# Patient Record
Sex: MALE | HISPANIC OR LATINO | Employment: OTHER | URBAN - METROPOLITAN AREA
[De-identification: names, ages, dates, MRNs, and addresses within clinical notes are randomized per-mention and may not be internally consistent; named-entity substitution may affect disease eponyms.]

---

## 2017-04-07 ENCOUNTER — OFFICE VISIT (OUTPATIENT)
Dept: MEDICAL GROUP | Facility: PHYSICIAN GROUP | Age: 42
End: 2017-04-07
Payer: COMMERCIAL

## 2017-04-07 VITALS
TEMPERATURE: 97.2 F | DIASTOLIC BLOOD PRESSURE: 86 MMHG | WEIGHT: 186 LBS | SYSTOLIC BLOOD PRESSURE: 126 MMHG | HEART RATE: 78 BPM | HEIGHT: 67 IN | OXYGEN SATURATION: 99 % | RESPIRATION RATE: 14 BRPM | BODY MASS INDEX: 29.19 KG/M2

## 2017-04-07 DIAGNOSIS — R73.09 ELEVATED HEMOGLOBIN A1C MEASUREMENT: ICD-10-CM

## 2017-04-07 DIAGNOSIS — Z87.19 H/O GASTROESOPHAGEAL REFLUX (GERD): ICD-10-CM

## 2017-04-07 DIAGNOSIS — M77.12 LEFT TENNIS ELBOW: ICD-10-CM

## 2017-04-07 DIAGNOSIS — F41.9 ANXIETY: ICD-10-CM

## 2017-04-07 PROCEDURE — 99214 OFFICE O/P EST MOD 30 MIN: CPT | Performed by: NURSE PRACTITIONER

## 2017-04-07 RX ORDER — IBUPROFEN 800 MG/1
800 TABLET ORAL EVERY 8 HOURS PRN
Qty: 30 TAB | Refills: 1 | Status: SHIPPED | OUTPATIENT
Start: 2017-04-07 | End: 2018-04-12

## 2017-04-07 ASSESSMENT — PATIENT HEALTH QUESTIONNAIRE - PHQ9: CLINICAL INTERPRETATION OF PHQ2 SCORE: 0

## 2017-04-07 NOTE — ASSESSMENT & PLAN NOTE
Was seen at [facility] 2 years ago with elevated A1c around 6.0.  Did follow lifestyle  Changes.  Is eating fast food with his work.  No polydypsia, polyuria.

## 2017-04-07 NOTE — PROGRESS NOTES
Chief Complaint   Patient presents with   • Establish Care   • Orders Needed     labs   • Palpitations   • Gastrophageal Reflux       Subjective:   Rakan Avalos is a 41 y.o.  male here today to establish care for evaluation and management of:    Elevated hemoglobin A1c measurement  Was seen at [facility] 2 years ago with elevated A1c around 6.0.  Did follow lifestyle  Changes.  Is eating fast food with his work.  No polydypsia, polyuria.      Anxiety  Patient states that sometimes when he comes home from work and has had an extremely tiring day he feels palpitations and anxiety about not being able to get all the work done that he needs to do. Patient is a . And is very busy. Patient states this occurs every 1-2 weeks. When this does occur he states that he just rests and tries to relax.    H/O gastroesophageal reflux (GERD)  Patient was recently seen in urgent care with chest pain that was diagnosed as GERD. He has eliminated spicy foods and coffee from his diet states that if he does have some symptoms he takes a Tums with good relief. He is trying to avoid eating late at night but he sometimes gets home from work at 8:30 or so and then has his meal.    Left tennis elbow  Patient describes tenderness over the lateral epicondyle of his left elbow. Patient states that when he shakes someone's hand or puts pressure on that elbow he has a sharp achy pain. He uses this hand, even though it is his nondominant hand, for his work as a . He has not tried anything for this.         Current medicines (including changes today)  Current Outpatient Prescriptions   Medication Sig Dispense Refill   • ibuprofen (MOTRIN) 800 MG Tab Take 1 Tab by mouth every 8 hours as needed. 30 Tab 1     No current facility-administered medications for this visit.     He  has a past medical history of GERD (gastroesophageal reflux disease) and Elevated blood sugar.    ROS as stated in history of present illness.  No  "chest pain, no shortness of breath, no abdominal pain       Objective:     Blood pressure 126/86, pulse 78, temperature 36.2 °C (97.2 °F), resp. rate 14, height 1.702 m (5' 7.01\"), weight 84.369 kg (186 lb), SpO2 99 %. Body mass index is 29.12 kg/(m^2).   Physical Exam:  Constitutional: Alert, no distress.  Skin: Warm, dry, good turgor,no cyanosis, no rashes in visible areas.  Eye: Equal, round and reactive, conjunctiva clear, lids normal.  Ears: No tenderness, no discharge.  External canals are without any significant edema or erythema.  Tympanic membranes are without any inflammation, no effusion.  Gross auditory acuity is intact.  Nose: symmetrical without tenderness, no discharge.  Mouth/Throat: lips without lesion.  Oropharynx clear.  Throat without erythema, exudates or tonsillar enlargement.  Neck: Trachea midline, no masses, no obvious thyroid enlargement.. No cervical or supraclavicular lymphadenopathy. Range of motion within normal limits.  Neuro: Cranial nerves 2-12 grossly intact.  No sensory deficit.  Respiratory: Unlabored respiratory effort, lungs clear to auscultation, no wheezes, no ronchi.  Cardiovascular: Normal S1, S2, no murmur, no edema.  Abdomen: Soft, non-tender, no masses, no guarding,  no hepatosplenomegaly.  MSK: Left elbow with shooting pain on deep palpation over the lateral epicondyles. No swelling noted. No recent injury reported.  Psych: Alert and oriented x3, normal affect and mood and judgement.        Assessment and Plan:   The following treatment plan was discussed    1. Elevated hemoglobin A1c measurement  This is a new problem to me. Chronic. Unknown status. We will draw lab work to evaluate his metabolic status. We discussed diet and exercise in relationship to a history of an elevated A1c. We'll monitor and follow results.  - COMP METABOLIC PANEL; Future  - HEMOGLOBIN A1C; Future  - LIPID PROFILE; Future    2. H/O gastroesophageal reflux (GERD)  This is a new problem to me. " Chronic. Stable. Continue to manage symptoms with diet. Discussed strategies as eating 6 smaller meals when possible. And to avoid late-night eating. Tums or over-the-counter omeprazole or ranitidine as needed when necessary.    3. Anxiety  This is a new problem to me. Chronic. Stable. We discussed relaxation techniques and exercise as a possible stress reduction technique for him. He does enjoy playing ball or going to the movies with his 14-year-old son.    4. Left tennis elbow  This is a new problem to me. Acute. Ongoing. Ibuprofen 800 mg by mouth twice a day along with ice after work. Educated patient to take ibuprofen with food to avoid gastrointestinal upset. Patient will return in 3 months to follow-up.      Followup: Return in about 3 months (around 7/7/2017) for Labs.

## 2017-04-07 NOTE — ASSESSMENT & PLAN NOTE
Patient describes tenderness over the lateral epicondyle of his left elbow. Patient states that when he shakes someone's hand or puts pressure on that elbow he has a sharp achy pain. He uses this hand, even though it is his nondominant hand, for his work as a . He has not tried anything for this.

## 2017-04-07 NOTE — ASSESSMENT & PLAN NOTE
Patient states that sometimes when he comes home from work and has had an extremely tiring day he feels palpitations and anxiety about not being able to get all the work done that he needs to do. Patient is a . And is very busy. Patient states this occurs every 1-2 weeks. When this does occur he states that he just rests and tries to relax.

## 2017-04-07 NOTE — ASSESSMENT & PLAN NOTE
Patient was recently seen in urgent care with chest pain that was diagnosed as GERD. He has eliminated spicy foods and coffee from his diet states that if he does have some symptoms he takes a Tums with good relief. He is trying to avoid eating late at night but he sometimes gets home from work at 8:30 or so and then has his meal.

## 2017-04-07 NOTE — MR AVS SNAPSHOT
"Rakan Avalos   2017 2:40 PM   Office Visit   MRN: [id number]    Department:  [facility]   Dept Phone:  [phone]    Description:  Male : 1975   Provider:  DERRICK Herrera           Reason for Visit     Establish Care     Orders Needed labs    Palpitations     Gastrophageal Reflux           Allergies as of 2017     No Known Allergies      You were diagnosed with     Elevated hemoglobin A1c measurement   [384615]       H/O gastroesophageal reflux (GERD)   [257082]       Anxiety   [393706]       Left tennis elbow   [399729]         Vital Signs     Blood Pressure Pulse Temperature Respirations Height Weight    126/86 mmHg 78 36.2 °C (97.2 °F) 14 1.702 m (5' 7.01\") 84.369 kg (186 lb)    Body Mass Index Oxygen Saturation Smoking Status             29.12 kg/m2 99% Never Smoker          Basic Information     Date Of Birth Sex Race Ethnicity Preferred Language    1975 Male  or   Origin (Yoruba,Mauritanian,Montserratian,Swiss, etc) English      Your appointments     2017  7:40 AM   Established Patient with DERRICK Herrera   [facility] ([place])    [address] [place] [address] [phone]           You will be receiving a confirmation call a few days before your appointment from our automated call confirmation system.              Problem List              ICD-10-CM Priority Class Noted - Resolved    Elevated hemoglobin A1c measurement R73.09   2017 - Present    H/O gastroesophageal reflux (GERD) Z87.19   2017 - Present    Anxiety F41.9   2017 - Present    Left tennis elbow M77.12   2017 - Present      Health Maintenance        Date Due Completion Dates    IMM DTaP/Tdap/Td Vaccine (1 - Tdap) 1992 ---            Current Immunizations     No immunizations on file.      Below and/or attached are the medications your provider expects you to take. Review all of your home medications and newly ordered " medications with your provider and/or pharmacist. Follow medication instructions as directed by your provider and/or pharmacist. Please keep your medication list with you and share with your provider. Update the information when medications are discontinued, doses are changed, or new medications (including over-the-counter products) are added; and carry medication information at all times in the event of emergency situations     Allergies:  No Known Allergies          Medications  Valid as of: April 07, 2017 -  3:15 PM    Generic Name Brand Name Tablet Size Instructions for use    Ibuprofen (Tab) MOTRIN 800 MG Take 1 Tab by mouth every 8 hours as needed.        .                 Medicines prescribed today were sent to:     [id number]  [facility], [place] - [address] AT [facility]    [address] [place] [address]    Phone: [phone] Fax: [phone]    Open 24 Hours?: No      Medication refill instructions:       If your prescription bottle indicates you have medication refills left, it is not necessary to call your provider’s office. Please contact your pharmacy and they will refill your medication.    If your prescription bottle indicates you do not have any refills left, you may request refills at any time through one of the following ways: The online [employer] system (except Urgent Care), by calling your provider’s office, or by asking your pharmacy to contact your provider’s office with a refill request. Medication refills are processed only during regular business hours and may not be available until the next business day. Your provider may request additional information or to have a follow-up visit with you prior to refilling your medication.   *Please Note: Medication refills are assigned a new Rx number when refilled electronically. Your pharmacy may indicate that no refills were authorized even though a new prescription for the same medication is available at the  pharmacy. Please request the medicine by name with the pharmacy before contacting your provider for a refill.        Your To Do List     Future Labs/Procedures Complete By Expires    COMP METABOLIC PANEL  As directed 4/8/2018    HEMOGLOBIN A1C  As directed 4/8/2018    LIPID PROFILE  As directed 4/8/2018         VisualXcript Access Code: [email or web address]  Expires: 5/7/2017  2:25 PM    VisualXcript  A secure, online tool to manage your health information     [employer]’s VisualXcript® is a secure, online tool that connects you to your personalized health information from the privacy of your home -- day or night - making it very easy for you to manage your healthcare. Once the activation process is completed, you can even access your medical information using the VisualXcript ronald, which is available for free in the Apple Ronald store or Google Play store.     VisualXcript provides the following levels of access (as shown below):   My Chart Features   [facility] Primary Care Doctor [facility]  Specialists [facility]  Urgent  Care [facility]  Primary Care  Doctor   Email your healthcare team securely and privately 24/7 X X X    Manage appointments: schedule your next appointment; view details of past/upcoming appointments X      Request prescription refills. X      View recent personal medical records, including lab and immunizations X X X X   View health record, including health history, allergies, medications X X X X   Read reports about your outpatient visits, procedures, consult and ER notes X X X X   See your discharge summary, which is a recap of your hospital and/or ER visit that includes your diagnosis, lab results, and care plan. X X       How to register for VisualXcript:  1. Go to  [email or web address].  2. Click on the Sign Up Now box, which takes you to the New Member Sign Up page. You will need to provide the following information:  a. Enter your VisualXcript Access Code exactly as it appears at the top of this page. (You will not need to use  this code after you’ve completed the sign-up process. If you do not sign up before the expiration date, you must request a new code.)   b. Enter your date of birth.   c. Enter your home email address.   d. Click Submit, and follow the next screen’s instructions.  3. Create a Thinkaturet ID. This will be your Thinkaturet login ID and cannot be changed, so think of one that is secure and easy to remember.  4. Create a Thinkaturet password. You can change your password at any time.  5. Enter your Password Reset Question and Answer. This can be used at a later time if you forget your password.   6. Enter your e-mail address. This allows you to receive e-mail notifications when new information is available in QuickBlox.  7. Click Sign Up. You can now view your health information.    For assistance activating your QuickBlox account, call [phone]

## 2017-04-08 ENCOUNTER — HOSPITAL ENCOUNTER (OUTPATIENT)
Dept: LAB | Facility: MEDICAL CENTER | Age: 42
End: 2017-04-08
Attending: NURSE PRACTITIONER
Payer: COMMERCIAL

## 2017-04-08 DIAGNOSIS — R73.09 ELEVATED HEMOGLOBIN A1C MEASUREMENT: ICD-10-CM

## 2017-04-08 LAB
ALBUMIN SERPL BCP-MCNC: 4.3 G/DL (ref 3.2–4.9)
ALBUMIN/GLOB SERPL: 1.6 G/DL
ALP SERPL-CCNC: 50 U/L (ref 30–99)
ALT SERPL-CCNC: 19 U/L (ref 2–50)
ANION GAP SERPL CALC-SCNC: 5 MMOL/L (ref 0–11.9)
AST SERPL-CCNC: 14 U/L (ref 12–45)
BILIRUB SERPL-MCNC: 0.6 MG/DL (ref 0.1–1.5)
BUN SERPL-MCNC: 19 MG/DL (ref 8–22)
CALCIUM SERPL-MCNC: 9.1 MG/DL (ref 8.5–10.5)
CHLORIDE SERPL-SCNC: 105 MMOL/L (ref 96–112)
CHOLEST SERPL-MCNC: 219 MG/DL (ref 100–199)
CO2 SERPL-SCNC: 28 MMOL/L (ref 20–33)
CREAT SERPL-MCNC: 0.89 MG/DL (ref 0.5–1.4)
EST. AVERAGE GLUCOSE BLD GHB EST-MCNC: 151 MG/DL
GFR SERPL CREATININE-BSD FRML MDRD: >60 ML/MIN/1.73 M 2
GLOBULIN SER CALC-MCNC: 2.7 G/DL (ref 1.9–3.5)
GLUCOSE SERPL-MCNC: 156 MG/DL (ref 65–99)
HBA1C MFR BLD: 6.9 % (ref 0–5.6)
HDLC SERPL-MCNC: 34 MG/DL
LDLC SERPL CALC-MCNC: ABNORMAL MG/DL
POTASSIUM SERPL-SCNC: 4.5 MMOL/L (ref 3.6–5.5)
PROT SERPL-MCNC: 7 G/DL (ref 6–8.2)
SODIUM SERPL-SCNC: 138 MMOL/L (ref 135–145)
TRIGL SERPL-MCNC: 588 MG/DL (ref 0–149)

## 2017-04-08 PROCEDURE — 80053 COMPREHEN METABOLIC PANEL: CPT

## 2017-04-08 PROCEDURE — 36415 COLL VENOUS BLD VENIPUNCTURE: CPT

## 2017-04-08 PROCEDURE — 83036 HEMOGLOBIN GLYCOSYLATED A1C: CPT

## 2017-04-08 PROCEDURE — 80061 LIPID PANEL: CPT

## 2017-07-12 ENCOUNTER — OFFICE VISIT (OUTPATIENT)
Dept: MEDICAL GROUP | Facility: PHYSICIAN GROUP | Age: 42
End: 2017-07-12
Payer: COMMERCIAL

## 2017-07-12 VITALS
DIASTOLIC BLOOD PRESSURE: 80 MMHG | BODY MASS INDEX: 27.15 KG/M2 | OXYGEN SATURATION: 98 % | SYSTOLIC BLOOD PRESSURE: 116 MMHG | HEART RATE: 63 BPM | RESPIRATION RATE: 14 BRPM | TEMPERATURE: 97.2 F | HEIGHT: 67 IN | WEIGHT: 173 LBS

## 2017-07-12 DIAGNOSIS — E11.9 TYPE 2 DIABETES MELLITUS WITHOUT COMPLICATION, WITHOUT LONG-TERM CURRENT USE OF INSULIN (HCC): ICD-10-CM

## 2017-07-12 PROCEDURE — 99214 OFFICE O/P EST MOD 30 MIN: CPT | Performed by: NURSE PRACTITIONER

## 2017-07-12 NOTE — ASSESSMENT & PLAN NOTE
Started metformin 3 months ago, family has completely changed eating patterns and exercise.  Weight down 13 pounds.  Walking most days.  States he is feeling much better and his whole family has been losing weight with the new diet and exercise.  Denies any numbness, tingling in feet or hands, polydypsia, polyphagia, polyuria.

## 2017-07-12 NOTE — MR AVS SNAPSHOT
"        Rakan Robbie   2017 7:20 AM   Office Visit   MRN: [id number]    Department:  [facility]   Dept Phone:  [phone]    Description:  Male : 1975   Provider:  DERRICK Herrera           Reason for Visit     Follow-Up           Allergies as of 2017     No Known Allergies      You were diagnosed with     Type 2 diabetes mellitus without complication, without long-term current use of insulin (CMS-HCC)   [7554241]         Vital Signs     Blood Pressure Pulse Temperature Respirations Height Weight    116/80 mmHg 63 36.2 °C (97.2 °F) 14 1.702 m (5' 7.01\") 78.472 kg (173 lb)    Body Mass Index Oxygen Saturation Smoking Status             27.09 kg/m2 98% Never Smoker          Basic Information     Date Of Birth Sex Race Ethnicity Preferred Language    1975 Male  or   Origin (Pashto,Luxembourger,Cymraes,Figueroa, etc) English      Your appointments     Oct 12, 2017  7:40 AM   Established Patient with DERRICK Herrera   [facility] ([place])    [address] [place] [address]   [phone]           You will be receiving a confirmation call a few days before your appointment from our automated call confirmation system.              Problem List              ICD-10-CM Priority Class Noted - Resolved    Elevated hemoglobin A1c measurement R73.09   2017 - Present    H/O gastroesophageal reflux (GERD) Z87.19   2017 - Present    Anxiety F41.9   2017 - Present    Left tennis elbow M77.12   2017 - Present    Type 2 diabetes mellitus without complication, without long-term current use of insulin (CMS-HCC) E11.9   2017 - Present      Health Maintenance        Date Due Completion Dates    IMM DTaP/Tdap/Td Vaccine (1 - Tdap) 1992 ---    IMM INFLUENZA (1) 2017 ---            Current Immunizations     No immunizations on file.      Below and/or attached are the medications your provider expects you to take. Review all " of your home medications and newly ordered medications with your provider and/or pharmacist. Follow medication instructions as directed by your provider and/or pharmacist. Please keep your medication list with you and share with your provider. Update the information when medications are discontinued, doses are changed, or new medications (including over-the-counter products) are added; and carry medication information at all times in the event of emergency situations     Allergies:  No Known Allergies          Medications  Valid as of: July 12, 2017 -  7:42 AM    Generic Name Brand Name Tablet Size Instructions for use    Ibuprofen (Tab) MOTRIN 800 MG Take 1 Tab by mouth every 8 hours as needed.        MetFORMIN HCl (Tab) GLUCOPHAGE 500 MG Take 1 Tab by mouth every day.        .                 Medicines prescribed today were sent to:     [facility] [address] [place] [address] [place], [place] - [address] AT [facility] [place] [address] [place] [address]    Phone: [phone] Fax: [phone]    Open 24 Hours?: No      Medication refill instructions:       If your prescription bottle indicates you have medication refills left, it is not necessary to call your provider’s office. Please contact your pharmacy and they will refill your medication.    If your prescription bottle indicates you do not have any refills left, you may request refills at any time through one of the following ways: The online [employer] system (except Urgent Care), by calling your provider’s office, or by asking your pharmacy to contact your provider’s office with a refill request. Medication refills are processed only during regular business hours and may not be available until the next business day. Your provider may request additional information or to have a follow-up visit with you prior to refilling your medication.   *Please Note: Medication refills are assigned a new Rx number when refilled electronically. Your pharmacy may  indicate that no refills were authorized even though a new prescription for the same medication is available at the pharmacy. Please request the medicine by name with the pharmacy before contacting your provider for a refill.        Your To Do List     Future Labs/Procedures Complete By Expires    COMP METABOLIC PANEL  As directed 7/13/2018    HEMOGLOBIN A1C  As directed 7/13/2018    LIPID PROFILE  As directed 7/13/2018    MICROALBUMIN CREAT RATIO URINE  As directed 7/13/2018         MyChart Access Code: Activation code not generated  Current MyChart Status: Active

## 2017-07-12 NOTE — PROGRESS NOTES
"Chief Complaint   Patient presents with   • Follow-Up       Subjective:   Rakan Avalos is a 41 y.o. male here today for evaluation and management of:    Type 2 diabetes mellitus without complication, without long-term current use of insulin (CMS-HCC)  Started metformin 3 months ago, family has completely changed eating patterns and exercise.  Weight down 13 pounds.  Walking most days.  States he is feeling much better and his whole family has been losing weight with the new diet and exercise.  Denies any numbness, tingling in feet or hands, polydypsia, polyphagia, polyuria.           Current medicines (including changes today)  Current Outpatient Prescriptions   Medication Sig Dispense Refill   • metformin (GLUCOPHAGE) 500 MG Tab Take 1 Tab by mouth every day. 30 Tab 3   • ibuprofen (MOTRIN) 800 MG Tab Take 1 Tab by mouth every 8 hours as needed. 30 Tab 1     No current facility-administered medications for this visit.     He  has a past medical history of GERD (gastroesophageal reflux disease); Elevated blood sugar; and Diabetes (CMS-[facility]).    ROS as stated in history of present illness.  No chest pain, no shortness of breath, no abdominal pain       Objective:     Blood pressure 116/80, pulse 63, temperature 36.2 °C (97.2 °F), resp. rate 14, height 1.702 m (5' 7.01\"), weight 78.472 kg (173 lb), SpO2 98 %. Body mass index is 27.09 kg/(m^2). down 13 pounds since our last visit.  Physical Exam:  Constitutional: Alert, no distress.  Skin: Warm, dry, good turgor,no cyanosis, no rashes in visible areas.  Eye: Equal, round and reactive, conjunctiva clear, lids normal.  Ears: No tenderness, no discharge.  External canals are without any significant edema or erythema.  .  Gross auditory acuity is intact.  Nose: symmetrical without tenderness, no discharge.  Mouth/Throat: lips without lesion.  Oropharynx clear.   Neck: Trachea midline, no masses, no obvious thyroid enlargement.. . Range of motion within normal " limits.  Neuro: Cranial nerves 2-12 grossly intact.  No sensory deficit.  Respiratory: Unlabored respiratory effort, lungs clear to auscultation, no wheezes, no ronchi.  Cardiovascular: Normal S1, S2, no murmur, no edema.  Psych: Alert and oriented x3, normal affect and mood and judgement.        Assessment and Plan:   The following treatment plan was discussed    1. Type 2 diabetes mellitus without complication, without long-term current use of insulin (CMS-[facility])  Chronic. Improved. A1c in the office today is 5.4. Patient congratulated on his lifestyle changes and taking control of his diabetes. Patient will continue with metformin 500 mg daily instead of twice a day. We will recheck A1c and other lab work including lipids and triglycerides in 3 months. Patient to follow-up in 3 months to review labs. Continue with diet and exercise program. Monitor and follow.  - HEMOGLOBIN A1C; Future  - COMP METABOLIC PANEL; Future  - LIPID PROFILE; Future  - MICROALBUMIN CREAT RATIO URINE; Future      Followup: Return in about 3 months (around 10/12/2017) for Diabetes.

## 2017-08-14 NOTE — TELEPHONE ENCOUNTER
Requested Prescriptions     Signed Prescriptions Disp Refills   • metformin (GLUCOPHAGE) 500 MG Tab 60 Tab 6     Sig: TAKE 1 TABLET BY MOUTH TWICE DAILY WITH MEALS     Authorizing Provider: MAHESH CORTES A.P.R.N.

## 2017-10-12 ENCOUNTER — TELEPHONE (OUTPATIENT)
Dept: MEDICAL GROUP | Facility: PHYSICIAN GROUP | Age: 42
End: 2017-10-12

## 2017-10-12 ENCOUNTER — OFFICE VISIT (OUTPATIENT)
Dept: MEDICAL GROUP | Facility: PHYSICIAN GROUP | Age: 42
End: 2017-10-12
Payer: COMMERCIAL

## 2017-10-12 VITALS
DIASTOLIC BLOOD PRESSURE: 68 MMHG | HEIGHT: 67 IN | OXYGEN SATURATION: 98 % | BODY MASS INDEX: 26.68 KG/M2 | SYSTOLIC BLOOD PRESSURE: 128 MMHG | TEMPERATURE: 96.6 F | WEIGHT: 170 LBS | HEART RATE: 60 BPM

## 2017-10-12 DIAGNOSIS — F41.9 ANXIETY: ICD-10-CM

## 2017-10-12 DIAGNOSIS — E11.9 TYPE 2 DIABETES MELLITUS WITHOUT COMPLICATION, WITHOUT LONG-TERM CURRENT USE OF INSULIN (HCC): ICD-10-CM

## 2017-10-12 DIAGNOSIS — Z87.19 H/O GASTROESOPHAGEAL REFLUX (GERD): ICD-10-CM

## 2017-10-12 DIAGNOSIS — Z23 NEED FOR VACCINATION: ICD-10-CM

## 2017-10-12 PROBLEM — M77.12 LEFT TENNIS ELBOW: Status: RESOLVED | Noted: 2017-04-07 | Resolved: 2017-10-12

## 2017-10-12 PROBLEM — R73.09 ELEVATED HEMOGLOBIN A1C MEASUREMENT: Status: RESOLVED | Noted: 2017-04-07 | Resolved: 2017-10-12

## 2017-10-12 LAB
HBA1C MFR BLD: 5.5 % (ref ?–5.8)
INT CON NEG: NEGATIVE
INT CON POS: POSITIVE

## 2017-10-12 PROCEDURE — 90472 IMMUNIZATION ADMIN EACH ADD: CPT | Performed by: NURSE PRACTITIONER

## 2017-10-12 PROCEDURE — 90471 IMMUNIZATION ADMIN: CPT | Performed by: NURSE PRACTITIONER

## 2017-10-12 PROCEDURE — 99214 OFFICE O/P EST MOD 30 MIN: CPT | Mod: 25 | Performed by: NURSE PRACTITIONER

## 2017-10-12 PROCEDURE — 90715 TDAP VACCINE 7 YRS/> IM: CPT | Performed by: NURSE PRACTITIONER

## 2017-10-12 PROCEDURE — 90732 PPSV23 VACC 2 YRS+ SUBQ/IM: CPT | Performed by: NURSE PRACTITIONER

## 2017-10-12 PROCEDURE — 83036 HEMOGLOBIN GLYCOSYLATED A1C: CPT | Performed by: NURSE PRACTITIONER

## 2017-10-12 ASSESSMENT — PAIN SCALES - GENERAL: PAINLEVEL: NO PAIN

## 2017-10-12 NOTE — PROGRESS NOTES
"Chief Complaint   Patient presents with   • Follow-Up     DM       Subjective:   Rakan Avalos is a 42 y.o.  male here today for evaluation and management of:    Type 2 diabetes mellitus without complication, without long-term current use of insulin (CMS-HCC)  States that he started taking metformin and was fine for two weeks, then started to get dizzy so he stopped.  He has been watching diet and exercising.  Down 16 pounds.     Anxiety  Much improved with his daily exercise.     H/O gastroesophageal reflux (GERD)  Reports that with diet changes he has solved this issue         Current medicines (including changes today)  Current Outpatient Prescriptions   Medication Sig Dispense Refill   • metformin (GLUCOPHAGE) 500 MG Tab TAKE 1 TABLET BY MOUTH TWICE DAILY WITH MEALS 60 Tab 6   • metformin (GLUCOPHAGE) 500 MG Tab Take 1 Tab by mouth every day. 30 Tab 3   • ibuprofen (MOTRIN) 800 MG Tab Take 1 Tab by mouth every 8 hours as needed. 30 Tab 1     No current facility-administered medications for this visit.      He  has a past medical history of Diabetes (CMS-HCC); Elevated blood sugar; and GERD (gastroesophageal reflux disease).    Ayesha stated in history of present illness.  No chest pain, no shortness of breath, no abdominal pain       Objective:     Blood pressure 128/68, pulse 60, temperature 35.9 °C (96.6 °F), height 1.702 m (5' 7\"), weight 77.1 kg (170 lb), SpO2 98 %. Body mass index is 26.63 kg/m². Patient is down 16 pounds since her last visit  Physical Exam:  Constitutional: Alert, no distress.  Skin: Warm, dry, good turgor,no cyanosis, no rashes in visible areas.  Eye: Equal, round and reactive, conjunctiva clear, lids normal.  Ears: No tenderness, no discharge.  External canals are without any significant edema or erythema.  Tympanic membranes are without any inflammation, no effusion.  Gross auditory acuity is intact.  Nose: symmetrical without tenderness, no discharge.  Mouth/Throat: lips without " lesion.  Oropharynx clear.  Throat without erythema, exudates or tonsillar enlargement.  Neck: Trachea midline, no masses, no obvious thyroid enlargement.. No cervical or supraclavicular lymphadenopathy. Range of motion within normal limits.  Neuro: Cranial nerves 2-12 grossly intact.  No sensory deficit.  Respiratory: Unlabored respiratory effort, lungs clear to auscultation, no wheezes, no ronchi.  Cardiovascular: Normal S1, S2, no murmur, no edema.  Abdomen: Soft, non-tender, no masses, no guarding,  no hepatosplenomegaly.  Psych: Alert and oriented x3, normal affect and mood and judgement.  Monofilament testing with a 10 gram force: sensation intact: intact bilaterally  Visual Inspection: Feet without maceration, ulcers, fissures.  Pedal pulses: intact bilaterally        Assessment and Plan:   The following treatment plan was discussed    1. Type 2 diabetes mellitus without complication, without long-term current use of insulin (CMS-[facility])  Ongoing issue. A1c today is 5.5. He is not on any medication he has made these changes with lifestyle management weight loss diet and exercise. He is feeling somewhat better. He will return in 6 months we will recheck labs at that time. Continue with great lifestyle changes. Monitor and follow.  - POCT  A1C  - Diabetic Monofilament LE Exam  - MICROALBUMIN CREAT RATIO URINE; Future  - LIPID PROFILE; Future  - HEMOGLOBIN A1C; Future  - COMP METABOLIC PANEL; Future    2. Anxiety  Patient states this has resolved. He states his exercise program has helped him immensely. Monitor.    3. H/O gastroesophageal reflux (GERD)  Chronic. Resolved. With his change of diet he has resolved this issue.    4. Need for vaccination  Patient requesting vaccines today. N   acute illness. Consent is signed.I have placed the below orders and discussed them with an approved delegating provider.  The MA is performing the below orders under the direction of Marshal Buck M.D.  - TDAP VACCINE =>8YO IM  -  Pneumococcal Conjugate Vaccine 13-Valent <6yo IM      Followup: Return in about 6 months (around 4/12/2018) for Diabetes, Labs.

## 2017-10-12 NOTE — ASSESSMENT & PLAN NOTE
States that he started taking metformin and was fine for two weeks, then started to get dizzy so he stopped.  He has been watching diet and exercising.  Down 16 pounds.

## 2018-04-12 ENCOUNTER — OFFICE VISIT (OUTPATIENT)
Dept: MEDICAL GROUP | Facility: PHYSICIAN GROUP | Age: 43
End: 2018-04-12
Payer: COMMERCIAL

## 2018-04-12 VITALS
SYSTOLIC BLOOD PRESSURE: 126 MMHG | BODY MASS INDEX: 27.94 KG/M2 | HEIGHT: 67 IN | OXYGEN SATURATION: 99 % | DIASTOLIC BLOOD PRESSURE: 82 MMHG | TEMPERATURE: 97.5 F | WEIGHT: 178 LBS | RESPIRATION RATE: 14 BRPM | HEART RATE: 53 BPM

## 2018-04-12 DIAGNOSIS — E11.9 TYPE 2 DIABETES MELLITUS WITHOUT COMPLICATION, WITHOUT LONG-TERM CURRENT USE OF INSULIN (HCC): ICD-10-CM

## 2018-04-12 DIAGNOSIS — Z87.19 H/O GASTROESOPHAGEAL REFLUX (GERD): ICD-10-CM

## 2018-04-12 DIAGNOSIS — F41.9 ANXIETY: ICD-10-CM

## 2018-04-12 LAB
HBA1C MFR BLD: 5.9 % (ref ?–5.8)
INT CON NEG: NEGATIVE
INT CON POS: POSITIVE

## 2018-04-12 PROCEDURE — 83036 HEMOGLOBIN GLYCOSYLATED A1C: CPT | Performed by: NURSE PRACTITIONER

## 2018-04-12 PROCEDURE — 99213 OFFICE O/P EST LOW 20 MIN: CPT | Performed by: NURSE PRACTITIONER

## 2018-04-12 ASSESSMENT — PATIENT HEALTH QUESTIONNAIRE - PHQ9: CLINICAL INTERPRETATION OF PHQ2 SCORE: 0

## 2018-04-12 NOTE — ASSESSMENT & PLAN NOTE
"A1c today is 5.9.  Not taking any medications.  No symptoms of fatigue, polydypsia, polyuria, polyphagia reported.  Watching diet and trying to exercise. States it has been harder to exercise this winter, but is motivated to get back to the gym.  We discussed \"pre-diabetes\" and for him to continue with diet and exercise management.   "

## 2018-04-12 NOTE — PROGRESS NOTES
"Chief Complaint   Patient presents with   • Follow-Up   • Diabetes       Subjective:   Rakan Avalos is a 42 y.o . male here today for evaluation and management of:    Type 2 diabetes mellitus without complication, without long-term current use of insulin (CMS-HCC)  A1c today is 5.9.  Not taking any medications.  No symptoms of fatigue, polydypsia, polyuria, polyphagia reported.  Watching diet and trying to exercise. States it has been harder to exercise this winter, but is motivated to get back to the gym.  We discussed \"pre-diabetes\" and for him to continue with diet and exercise management.     Anxiety  Resolved.  Doing very well.      H/O gastroesophageal reflux (GERD)  Resolved.  No symptoms with diet changes.          Current medicines (including changes today)  No current outpatient prescriptions on file.     No current facility-administered medications for this visit.      He  has a past medical history of Diabetes (CMS-HCC); Elevated blood sugar; and GERD (gastroesophageal reflux disease).    ROS as stated in hpi  No chest pain, no shortness of breath, no abdominal pain       Objective:     Blood pressure 126/82, pulse (!) 53, temperature 36.4 °C (97.5 °F), resp. rate 14, height 1.702 m (5' 7\"), weight 80.7 kg (178 lb), SpO2 99 %. Body mass index is 27.88 kg/m². stable.   Physical Exam:  Constitutional: Alert, no distress.  Skin: Warm, dry, good turgor,no cyanosis, no rashes in visible areas.  Eye: Equal, round and reactive, conjunctiva clear, lids normal.  Ears: No tenderness, no discharge.  External canals are without any significant edema or erythema.  Tympanic membranes are without any inflammation, no effusion.  Gross auditory acuity is intact.  Nose: symmetrical without tenderness, no discharge.  Mouth/Throat: lips without lesion.  Oropharynx clear.  Throat without erythema, exudates or tonsillar enlargement.  Neck: Trachea midline, no masses, no obvious thyroid enlargement.. No cervical or " supraclavicular lymphadenopathy. Range of motion within normal limits.  Neuro: Cranial nerves 2-12 grossly intact.  No sensory deficit.  Respiratory: Unlabored respiratory effort, lungs clear to auscultation, no wheezes, no ronchi.  Cardiovascular: Normal S1, S2, no murmur, no edema.  Abdomen: Soft, non-tender, no masses, no guarding,  no hepatosplenomegaly.  Psych: Alert and oriented x3, normal affect and mood and judgement.        Assessment and Plan:   The following treatment plan was discussed    1. Type 2 diabetes mellitus without complication, without long-term current use of insulin (CMS-HCC)  Chronic. Ongoing.  A1c 5.9 today. Continue with diet and exercise control.  Will recheck labs in 6 months.  Monitor and follow.   - COMP METABOLIC PANEL; Future  - LIPID PROFILE; Future  - MICROALBUMIN CREAT RATIO URINE; Future  - POCT Hemoglobin A1C  - HEMOGLOBIN A1C; Future    2. Anxiety  Resolved with exercise.    3. H/O gastroesophageal reflux (GERD)  Resolved with dietary changes.       Followup: Return in about 6 months (around 10/12/2018) for Diabetes.

## 2018-10-16 ENCOUNTER — OFFICE VISIT (OUTPATIENT)
Dept: MEDICAL GROUP | Facility: PHYSICIAN GROUP | Age: 43
End: 2018-10-16
Payer: COMMERCIAL

## 2018-10-16 VITALS
OXYGEN SATURATION: 98 % | RESPIRATION RATE: 14 BRPM | BODY MASS INDEX: 27.94 KG/M2 | WEIGHT: 178 LBS | HEART RATE: 56 BPM | SYSTOLIC BLOOD PRESSURE: 126 MMHG | DIASTOLIC BLOOD PRESSURE: 84 MMHG | TEMPERATURE: 96.8 F | HEIGHT: 67 IN

## 2018-10-16 DIAGNOSIS — R73.03 PREDIABETES: ICD-10-CM

## 2018-10-16 DIAGNOSIS — E78.2 ELEVATED CHOLESTEROL WITH ELEVATED TRIGLYCERIDES: ICD-10-CM

## 2018-10-16 LAB
HBA1C MFR BLD: 5.8 % (ref ?–5.8)
INT CON NEG: NEGATIVE
INT CON POS: POSITIVE

## 2018-10-16 PROCEDURE — 83036 HEMOGLOBIN GLYCOSYLATED A1C: CPT | Performed by: NURSE PRACTITIONER

## 2018-10-16 PROCEDURE — 99213 OFFICE O/P EST LOW 20 MIN: CPT | Performed by: NURSE PRACTITIONER

## 2018-10-16 NOTE — PROGRESS NOTES
"Chief Complaint   Patient presents with   • Follow-Up   • Diabetes       Subjective:   Rakan Avalos is a 43 y.o.  male here today for evaluation and management of:    Prediabetes  Here for follow up.  A1c 5.8 today.  Weight stable.  No medications.  Feeling good.  Due for lipids today.  Will change diagnosis to prediabetes.          Current medicines (including changes today)  No current outpatient prescriptions on file.     No current facility-administered medications for this visit.      He  has a past medical history of Diabetes (HCC); Elevated blood sugar; and GERD (gastroesophageal reflux disease).    ROS as stated in hpi  No chest pain, no shortness of breath, no abdominal pain       Objective:     Blood pressure 126/84, pulse (!) 56, temperature 36 °C (96.8 °F), temperature source Temporal, resp. rate 14, height 1.702 m (5' 7\"), weight 80.7 kg (178 lb), SpO2 98 %. Body mass index is 27.88 kg/m². stable.   Physical Exam:  Constitutional: Alert, no distress.  Skin: Warm, dry, good turgor,no cyanosis, no rashes in visible areas.  Eye: Equal, round and reactive, conjunctiva clear, lids normal.  Ears: No tenderness, no discharge.  External canals are without any significant edema or erythema.  Gross auditory acuity is intact.  Nose: symmetrical without tenderness, no discharge.  Mouth/Throat: lips without lesion.  Oropharynx clear.   Neck: Trachea midline, no masses, no obvious thyroid enlargement.. . Range of motion within normal limits.  Neuro: Cranial nerves 2-12 grossly intact.  No sensory deficit.  Respiratory: Unlabored respiratory effort, lungs clear to auscultation, no wheezes, no ronchi.  Cardiovascular: Normal S1, S2, no murmur, no edema.  Psych: Alert and oriented x3, normal affect and mood and judgement.        Assessment and Plan:   The following treatment plan was discussed    1. Elevated cholesterol with elevated triglycerides  Chronic, ongoing.  Labs due.  Monitor and follow.   - COMP " METABOLIC PANEL; Future  - LIPID PROFILE; Future    2. Prediabetes  Chronic conditiion.  A1c today at 5.8.  This is down from 6.9 18 months ago.  Has been exercising, watching diet.  Advised to continue to watch weight, diet and exercise. Return in one year.       Followup: Return in about 1 year (around 10/16/2019) for Annual.         Educated in proper administration of medication(s) ordered today including safety, possible SE, risks, benefits, rationale and alternatives to therapy.     Please note that this dictation was created using voice recognition software. I have made every reasonable attempt to correct obvious errors, but I expect that there are errors of grammar and possibly content that I did not discover before finalizing the note.

## 2018-10-16 NOTE — ASSESSMENT & PLAN NOTE
Here for follow up.  A1c 5.8 today.  Weight stable.  No medications.  Feeling good.  Due for lipids today.  Will change diagnosis to prediabetes.

## 2018-12-17 ENCOUNTER — HOSPITAL ENCOUNTER (OUTPATIENT)
Dept: LAB | Facility: MEDICAL CENTER | Age: 43
End: 2018-12-17
Attending: NURSE PRACTITIONER
Payer: COMMERCIAL

## 2018-12-17 DIAGNOSIS — E78.2 ELEVATED CHOLESTEROL WITH ELEVATED TRIGLYCERIDES: ICD-10-CM

## 2018-12-17 DIAGNOSIS — E11.9 TYPE 2 DIABETES MELLITUS WITHOUT COMPLICATION, WITHOUT LONG-TERM CURRENT USE OF INSULIN (HCC): ICD-10-CM

## 2018-12-17 LAB
ALBUMIN SERPL BCP-MCNC: 4.4 G/DL (ref 3.2–4.9)
ALBUMIN/GLOB SERPL: 1.8 G/DL
ALP SERPL-CCNC: 53 U/L (ref 30–99)
ALT SERPL-CCNC: 16 U/L (ref 2–50)
ANION GAP SERPL CALC-SCNC: 8 MMOL/L (ref 0–11.9)
AST SERPL-CCNC: 14 U/L (ref 12–45)
BILIRUB SERPL-MCNC: 0.6 MG/DL (ref 0.1–1.5)
BUN SERPL-MCNC: 17 MG/DL (ref 8–22)
CALCIUM SERPL-MCNC: 9.4 MG/DL (ref 8.5–10.5)
CHLORIDE SERPL-SCNC: 105 MMOL/L (ref 96–112)
CHOLEST SERPL-MCNC: 195 MG/DL (ref 100–199)
CO2 SERPL-SCNC: 27 MMOL/L (ref 20–33)
CREAT SERPL-MCNC: 1 MG/DL (ref 0.5–1.4)
GLOBULIN SER CALC-MCNC: 2.5 G/DL (ref 1.9–3.5)
GLUCOSE SERPL-MCNC: 119 MG/DL (ref 65–99)
HDLC SERPL-MCNC: 43 MG/DL
LDLC SERPL CALC-MCNC: 103 MG/DL
POTASSIUM SERPL-SCNC: 4.2 MMOL/L (ref 3.6–5.5)
PROT SERPL-MCNC: 6.9 G/DL (ref 6–8.2)
SODIUM SERPL-SCNC: 140 MMOL/L (ref 135–145)
TRIGL SERPL-MCNC: 245 MG/DL (ref 0–149)

## 2018-12-17 PROCEDURE — 80053 COMPREHEN METABOLIC PANEL: CPT

## 2018-12-17 PROCEDURE — 83036 HEMOGLOBIN GLYCOSYLATED A1C: CPT

## 2018-12-17 PROCEDURE — 80061 LIPID PANEL: CPT

## 2018-12-17 PROCEDURE — 36415 COLL VENOUS BLD VENIPUNCTURE: CPT

## 2018-12-18 LAB
EST. AVERAGE GLUCOSE BLD GHB EST-MCNC: 137 MG/DL
HBA1C MFR BLD: 6.4 % (ref 0–5.6)

## 2019-07-27 ENCOUNTER — APPOINTMENT (OUTPATIENT)
Dept: URGENT CARE | Facility: PHYSICIAN GROUP | Age: 44
End: 2019-07-27
Payer: COMMERCIAL

## 2019-07-30 ENCOUNTER — OFFICE VISIT (OUTPATIENT)
Dept: MEDICAL GROUP | Facility: PHYSICIAN GROUP | Age: 44
End: 2019-07-30
Payer: COMMERCIAL

## 2019-07-30 ENCOUNTER — HOSPITAL ENCOUNTER (OUTPATIENT)
Dept: RADIOLOGY | Facility: MEDICAL CENTER | Age: 44
End: 2019-07-30
Attending: NURSE PRACTITIONER
Payer: COMMERCIAL

## 2019-07-30 ENCOUNTER — APPOINTMENT (OUTPATIENT)
Dept: MEDICAL GROUP | Facility: PHYSICIAN GROUP | Age: 44
End: 2019-07-30
Payer: COMMERCIAL

## 2019-07-30 VITALS
HEIGHT: 67 IN | OXYGEN SATURATION: 98 % | WEIGHT: 179 LBS | TEMPERATURE: 97.3 F | SYSTOLIC BLOOD PRESSURE: 124 MMHG | DIASTOLIC BLOOD PRESSURE: 80 MMHG | HEART RATE: 51 BPM | RESPIRATION RATE: 14 BRPM | BODY MASS INDEX: 28.09 KG/M2

## 2019-07-30 DIAGNOSIS — M79.89 PAIN AND SWELLING OF LEFT LOWER LEG: ICD-10-CM

## 2019-07-30 DIAGNOSIS — M79.662 PAIN AND SWELLING OF LEFT LOWER LEG: ICD-10-CM

## 2019-07-30 PROBLEM — D16.22: Status: ACTIVE | Noted: 2019-07-30

## 2019-07-30 PROCEDURE — 73590 X-RAY EXAM OF LOWER LEG: CPT | Mod: LT

## 2019-07-30 PROCEDURE — 99214 OFFICE O/P EST MOD 30 MIN: CPT | Performed by: NURSE PRACTITIONER

## 2019-07-30 ASSESSMENT — PATIENT HEALTH QUESTIONNAIRE - PHQ9: CLINICAL INTERPRETATION OF PHQ2 SCORE: 0

## 2019-07-30 NOTE — ASSESSMENT & PLAN NOTE
Reports two months of intermittent of pain and swelling left lower leg.  No injury reported.  Lays hardwood floors, using full length knee pads.  Took some advil for the pain, elevation.  No redness, hot areas. No swelling appreciated today

## 2019-07-30 NOTE — PROGRESS NOTES
"Chief Complaint   Patient presents with   • Ankle Swelling     left   • Leg Pain     left       Subjective:   Rakan Avalos is a 44 y.o. male here today for evaluation and management of an new acute issue    Pain and swelling of left lower leg  Reports two months of intermittent of pain and swelling left lower leg.  No injury reported.  Lays hardwood floors, using full length knee pads.  Took some advil for the pain, elevation.  No redness, hot areas. No swelling appreciated today           Current medicines (including changes today)  No current outpatient prescriptions on file.     No current facility-administered medications for this visit.      He  has a past medical history of Diabetes (HCC); Elevated blood sugar; GERD (gastroesophageal reflux disease); and Hyperlipidemia.    ROS as stated in HPI  No chest pain, no shortness of breath, no abdominal pain       Objective:     /80   Pulse (!) 51   Temp 36.3 °C (97.3 °F)   Resp 14   Ht 1.702 m (5' 7\")   Wt 81.2 kg (179 lb)   SpO2 98%  Body mass index is 28.04 kg/m².     Physical Exam:  Constitutional: Alert, no distress.  Skin: Warm, dry, good turgor,no cyanosis, no rashes in visible areas.  Eye: Equal, round and reactive, conjunctiva clear, lids normal.  Ears: No tenderness, no discharge.  External canals are without any significant edema or erythema.  Tympanic membranes are without any inflammation, no effusion.  Gross auditory acuity is intact.  Nose: symmetrical without tenderness, no discharge.  Mouth/Throat: lips without lesion.  Oropharynx clear.  Throat without erythema, exudates or tonsillar enlargement.  Neck: Trachea midline, no masses, no obvious thyroid enlargement.. No cervical or supraclavicular lymphadenopathy. Range of motion within normal limits.  Neuro: Cranial nerves 2-12 grossly intact.  No sensory deficit.  Respiratory: Unlabored respiratory effort, lungs clear to auscultation, no wheezes, no ronchi.  Cardiovascular: Normal S1, S2, " no murmur, no edema.  Abdomen: Soft, non-tender, no masses, no guarding,  no hepatosplenomegaly.  msk; left leg without swelling or redness on exam.  There is an area of tenderness over the tibial prominence with pressure.  No bruising noted.  Psych: Alert and oriented x3, normal affect and mood and judgement.        Assessment and Plan:   The following treatment plan was discussed    1. Pain and swelling of left lower leg  This is a new problem to me.  Acute issue.  Suspect that his work kneepads that extend over his leg are causing this issue.  Will obtain an x-ray to rule out any kind of a bony involvement.  Low suspicion for any vascular problem.  X-ray ordered.  Monitor and follow.  Encouraged ice after work.  Continue with Advil for pain.  Monitor and follow.  - DX-TIBIA AND FIBULA LEFT; Future      Followup: Return if symptoms worsen or fail to improve.         Educated in proper administration of medication(s) ordered today including safety, possible SE, risks, benefits, rationale and alternatives to therapy.     Please note that this dictation was created using voice recognition software. I have made every reasonable attempt to correct obvious errors, but I expect that there are errors of grammar and possibly content that I did not discover before finalizing the note.

## 2019-10-16 ENCOUNTER — OFFICE VISIT (OUTPATIENT)
Dept: MEDICAL GROUP | Facility: PHYSICIAN GROUP | Age: 44
End: 2019-10-16
Payer: COMMERCIAL

## 2019-10-16 ENCOUNTER — HOSPITAL ENCOUNTER (OUTPATIENT)
Dept: LAB | Facility: MEDICAL CENTER | Age: 44
End: 2019-10-16
Attending: NURSE PRACTITIONER
Payer: COMMERCIAL

## 2019-10-16 VITALS
HEIGHT: 67 IN | TEMPERATURE: 96.2 F | OXYGEN SATURATION: 99 % | RESPIRATION RATE: 14 BRPM | BODY MASS INDEX: 28.09 KG/M2 | SYSTOLIC BLOOD PRESSURE: 104 MMHG | HEART RATE: 56 BPM | WEIGHT: 179 LBS | DIASTOLIC BLOOD PRESSURE: 62 MMHG

## 2019-10-16 DIAGNOSIS — Z87.19 H/O GASTROESOPHAGEAL REFLUX (GERD): ICD-10-CM

## 2019-10-16 DIAGNOSIS — R73.03 PREDIABETES: ICD-10-CM

## 2019-10-16 DIAGNOSIS — D16.22: ICD-10-CM

## 2019-10-16 DIAGNOSIS — Z00.00 PREVENTATIVE HEALTH CARE: ICD-10-CM

## 2019-10-16 LAB
ALBUMIN SERPL BCP-MCNC: 4.7 G/DL (ref 3.2–4.9)
ALBUMIN/GLOB SERPL: 1.6 G/DL
ALP SERPL-CCNC: 51 U/L (ref 30–99)
ALT SERPL-CCNC: 16 U/L (ref 2–50)
ANION GAP SERPL CALC-SCNC: 7 MMOL/L (ref 0–11.9)
AST SERPL-CCNC: 16 U/L (ref 12–45)
BASOPHILS # BLD AUTO: 0.7 % (ref 0–1.8)
BASOPHILS # BLD: 0.03 K/UL (ref 0–0.12)
BILIRUB SERPL-MCNC: 0.8 MG/DL (ref 0.1–1.5)
BUN SERPL-MCNC: 22 MG/DL (ref 8–22)
CALCIUM SERPL-MCNC: 9.5 MG/DL (ref 8.5–10.5)
CHLORIDE SERPL-SCNC: 102 MMOL/L (ref 96–112)
CHOLEST SERPL-MCNC: 215 MG/DL (ref 100–199)
CO2 SERPL-SCNC: 28 MMOL/L (ref 20–33)
CREAT SERPL-MCNC: 1.03 MG/DL (ref 0.5–1.4)
CREAT UR-MCNC: 154.3 MG/DL
EOSINOPHIL # BLD AUTO: 0.15 K/UL (ref 0–0.51)
EOSINOPHIL NFR BLD: 3.5 % (ref 0–6.9)
ERYTHROCYTE [DISTWIDTH] IN BLOOD BY AUTOMATED COUNT: 39.7 FL (ref 35.9–50)
EST. AVERAGE GLUCOSE BLD GHB EST-MCNC: 134 MG/DL
FASTING STATUS PATIENT QL REPORTED: NORMAL
GLOBULIN SER CALC-MCNC: 3 G/DL (ref 1.9–3.5)
GLUCOSE SERPL-MCNC: 117 MG/DL (ref 65–99)
HBA1C MFR BLD: 6.3 % (ref 0–5.6)
HCT VFR BLD AUTO: 47.7 % (ref 42–52)
HDLC SERPL-MCNC: 38 MG/DL
HGB BLD-MCNC: 16.9 G/DL (ref 14–18)
IMM GRANULOCYTES # BLD AUTO: 0.02 K/UL (ref 0–0.11)
IMM GRANULOCYTES NFR BLD AUTO: 0.5 % (ref 0–0.9)
LDLC SERPL CALC-MCNC: ABNORMAL MG/DL
LYMPHOCYTES # BLD AUTO: 1.02 K/UL (ref 1–4.8)
LYMPHOCYTES NFR BLD: 23.6 % (ref 22–41)
MCH RBC QN AUTO: 32.3 PG (ref 27–33)
MCHC RBC AUTO-ENTMCNC: 35.4 G/DL (ref 33.7–35.3)
MCV RBC AUTO: 91.2 FL (ref 81.4–97.8)
MICROALBUMIN UR-MCNC: <0.7 MG/DL
MICROALBUMIN/CREAT UR: NORMAL MG/G (ref 0–30)
MONOCYTES # BLD AUTO: 0.34 K/UL (ref 0–0.85)
MONOCYTES NFR BLD AUTO: 7.9 % (ref 0–13.4)
NEUTROPHILS # BLD AUTO: 2.77 K/UL (ref 1.82–7.42)
NEUTROPHILS NFR BLD: 63.8 % (ref 44–72)
NRBC # BLD AUTO: 0 K/UL
NRBC BLD-RTO: 0 /100 WBC
PLATELET # BLD AUTO: 205 K/UL (ref 164–446)
PMV BLD AUTO: 10.5 FL (ref 9–12.9)
POTASSIUM SERPL-SCNC: 4.3 MMOL/L (ref 3.6–5.5)
PROT SERPL-MCNC: 7.7 G/DL (ref 6–8.2)
RBC # BLD AUTO: 5.23 M/UL (ref 4.7–6.1)
SODIUM SERPL-SCNC: 137 MMOL/L (ref 135–145)
TRIGL SERPL-MCNC: 430 MG/DL (ref 0–149)
WBC # BLD AUTO: 4.3 K/UL (ref 4.8–10.8)

## 2019-10-16 PROCEDURE — 80053 COMPREHEN METABOLIC PANEL: CPT

## 2019-10-16 PROCEDURE — 36415 COLL VENOUS BLD VENIPUNCTURE: CPT

## 2019-10-16 PROCEDURE — 82570 ASSAY OF URINE CREATININE: CPT

## 2019-10-16 PROCEDURE — 80061 LIPID PANEL: CPT

## 2019-10-16 PROCEDURE — 85025 COMPLETE CBC W/AUTO DIFF WBC: CPT

## 2019-10-16 PROCEDURE — 83036 HEMOGLOBIN GLYCOSYLATED A1C: CPT

## 2019-10-16 PROCEDURE — 82043 UR ALBUMIN QUANTITATIVE: CPT

## 2019-10-16 PROCEDURE — 99386 PREV VISIT NEW AGE 40-64: CPT | Performed by: NURSE PRACTITIONER

## 2019-10-16 NOTE — PROGRESS NOTES
"Chief Complaint   Patient presents with   • Annual Exam       Subjective:   Rakan Avalos is a 44 y.o. male here today forannual preventative exam    H/O gastroesophageal reflux (GERD)  Diet controlled these symptoms.     Prediabetes  Weight is stable, not currently exercising, is watching diet.     Osteochondroma of fibula, left  Has switched knee pads and pain has improved.       Declines flu shot today    Current medicines (including changes today)  No current outpatient medications on file.     No current facility-administered medications for this visit.      He  has a past medical history of Diabetes (HCC), Elevated blood sugar, GERD (gastroesophageal reflux disease), and Hyperlipidemia.    ROS as stated in hpi  No chest pain, no shortness of breath, no abdominal pain       Objective:     /62 (BP Location: Left arm, Patient Position: Sitting)   Pulse (!) 56   Temp (!) 35.7 °C (96.2 °F)   Resp 14   Ht 1.702 m (5' 7\")   Wt 81.2 kg (179 lb)   SpO2 99%  Body mass index is 28.04 kg/m².   Physical Exam:  Constitutional: Alert, no distress.  Skin: Warm, dry, good turgor,no cyanosis, no rashes in visible areas.  Eye: Equal, round and reactive, conjunctiva clear, lids normal.  Ears: No tenderness, no discharge.  External canals are without any significant edema or erythema.  Tympanic membranes are without any inflammation, no effusion.  Gross auditory acuity is intact.  Nose: symmetrical without tenderness, no discharge.  Mouth/Throat: lips without lesion.  Oropharynx clear.  Throat without erythema, exudates or tonsillar enlargement.  Neck: Trachea midline, no masses, no obvious thyroid enlargement.. No cervical or supraclavicular lymphadenopathy. Range of motion within normal limits.  Neuro: Cranial nerves 2-12 grossly intact.  No sensory deficit.  Respiratory: Unlabored respiratory effort, lungs clear to auscultation, no wheezes, no ronchi.  Cardiovascular: Normal S1, S2, no murmur, no edema.  Abdomen: " Soft, non-tender, no masses, no guarding,  no hepatosplenomegaly.  Psych: Alert and oriented x3, normal affect and mood and judgement.        Assessment and Plan:   The following treatment plan was discussed    1. Prediabetes  Chronic, ongoing, due for labs.  Orders provided.  Discussed lifestyle controls, encouraged exercise to help with his health and stress.   - HEMOGLOBIN A1C; Future  - MICROALBUMIN CREAT RATIO URINE; Future  - Lipid Profile; Future    2. Preventative health care  Due for labs.  Orders provided. Monitor and follow.   - CBC WITH DIFFERENTIAL; Future  - Comp Metabolic Panel; Future    3. H/O gastroesophageal reflux (GERD)  Resolved with dietary changes    4. Osteochondroma of fibula, left  Chronic, ongoing. Stable at this time.  New knee pads are helping.  Monitor and follow.       Followup: Return in about 1 year (around 10/16/2020).         Educated in proper administration of medication(s) ordered today including safety, possible SE, risks, benefits, rationale and alternatives to therapy.     Please note that this dictation was created using voice recognition software. I have made every reasonable attempt to correct obvious errors, but I expect that there are errors of grammar and possibly content that I did not discover before finalizing the note.

## 2019-10-17 ENCOUNTER — PATIENT MESSAGE (OUTPATIENT)
Dept: MEDICAL GROUP | Facility: PHYSICIAN GROUP | Age: 44
End: 2019-10-17

## 2019-10-17 DIAGNOSIS — E78.2 MIXED HYPERLIPIDEMIA: ICD-10-CM

## 2021-09-10 ENCOUNTER — APPOINTMENT (OUTPATIENT)
Dept: MEDICAL GROUP | Facility: PHYSICIAN GROUP | Age: 46
End: 2021-09-10
Payer: COMMERCIAL

## 2021-09-15 ENCOUNTER — OFFICE VISIT (OUTPATIENT)
Dept: MEDICAL GROUP | Facility: PHYSICIAN GROUP | Age: 46
End: 2021-09-15
Payer: COMMERCIAL

## 2021-09-15 ENCOUNTER — HOSPITAL ENCOUNTER (OUTPATIENT)
Dept: LAB | Facility: MEDICAL CENTER | Age: 46
End: 2021-09-15
Attending: NURSE PRACTITIONER
Payer: COMMERCIAL

## 2021-09-15 VITALS
HEIGHT: 67 IN | SYSTOLIC BLOOD PRESSURE: 116 MMHG | DIASTOLIC BLOOD PRESSURE: 80 MMHG | OXYGEN SATURATION: 100 % | RESPIRATION RATE: 16 BRPM | TEMPERATURE: 96.1 F | WEIGHT: 179 LBS | HEART RATE: 56 BPM | BODY MASS INDEX: 28.09 KG/M2

## 2021-09-15 DIAGNOSIS — R73.03 PREDIABETES: ICD-10-CM

## 2021-09-15 DIAGNOSIS — E78.2 ELEVATED TRIGLYCERIDES WITH HIGH CHOLESTEROL: ICD-10-CM

## 2021-09-15 DIAGNOSIS — R13.13 PHARYNGEAL DYSPHAGIA: ICD-10-CM

## 2021-09-15 DIAGNOSIS — Z83.3 FAMILY HISTORY OF DIABETES MELLITUS: ICD-10-CM

## 2021-09-15 LAB
ALBUMIN SERPL BCP-MCNC: 4.4 G/DL (ref 3.2–4.9)
ALBUMIN/GLOB SERPL: 1.6 G/DL
ALP SERPL-CCNC: 61 U/L (ref 30–99)
ALT SERPL-CCNC: 17 U/L (ref 2–50)
ANION GAP SERPL CALC-SCNC: 9 MMOL/L (ref 7–16)
AST SERPL-CCNC: 13 U/L (ref 12–45)
BASOPHILS # BLD AUTO: 0.8 % (ref 0–1.8)
BASOPHILS # BLD: 0.04 K/UL (ref 0–0.12)
BILIRUB SERPL-MCNC: 0.7 MG/DL (ref 0.1–1.5)
BUN SERPL-MCNC: 12 MG/DL (ref 8–22)
CALCIUM SERPL-MCNC: 9.2 MG/DL (ref 8.5–10.5)
CHLORIDE SERPL-SCNC: 103 MMOL/L (ref 96–112)
CHOLEST SERPL-MCNC: 211 MG/DL (ref 100–199)
CO2 SERPL-SCNC: 27 MMOL/L (ref 20–33)
CREAT SERPL-MCNC: 0.82 MG/DL (ref 0.5–1.4)
EOSINOPHIL # BLD AUTO: 0.17 K/UL (ref 0–0.51)
EOSINOPHIL NFR BLD: 3.4 % (ref 0–6.9)
ERYTHROCYTE [DISTWIDTH] IN BLOOD BY AUTOMATED COUNT: 39.6 FL (ref 35.9–50)
EST. AVERAGE GLUCOSE BLD GHB EST-MCNC: 126 MG/DL
FASTING STATUS PATIENT QL REPORTED: NORMAL
GLOBULIN SER CALC-MCNC: 2.8 G/DL (ref 1.9–3.5)
GLUCOSE SERPL-MCNC: 145 MG/DL (ref 65–99)
HBA1C MFR BLD: 6 % (ref 4–5.6)
HCT VFR BLD AUTO: 44.9 % (ref 42–52)
HDLC SERPL-MCNC: 39 MG/DL
HGB BLD-MCNC: 15.8 G/DL (ref 14–18)
IMM GRANULOCYTES # BLD AUTO: 0.02 K/UL (ref 0–0.11)
IMM GRANULOCYTES NFR BLD AUTO: 0.4 % (ref 0–0.9)
LDLC SERPL CALC-MCNC: 112 MG/DL
LYMPHOCYTES # BLD AUTO: 1.16 K/UL (ref 1–4.8)
LYMPHOCYTES NFR BLD: 23.5 % (ref 22–41)
MCH RBC QN AUTO: 31.9 PG (ref 27–33)
MCHC RBC AUTO-ENTMCNC: 35.2 G/DL (ref 33.7–35.3)
MCV RBC AUTO: 90.5 FL (ref 81.4–97.8)
MONOCYTES # BLD AUTO: 0.4 K/UL (ref 0–0.85)
MONOCYTES NFR BLD AUTO: 8.1 % (ref 0–13.4)
NEUTROPHILS # BLD AUTO: 3.15 K/UL (ref 1.82–7.42)
NEUTROPHILS NFR BLD: 63.8 % (ref 44–72)
NRBC # BLD AUTO: 0 K/UL
NRBC BLD-RTO: 0 /100 WBC
PLATELET # BLD AUTO: 204 K/UL (ref 164–446)
PMV BLD AUTO: 10.5 FL (ref 9–12.9)
POTASSIUM SERPL-SCNC: 4.8 MMOL/L (ref 3.6–5.5)
PROT SERPL-MCNC: 7.2 G/DL (ref 6–8.2)
RBC # BLD AUTO: 4.96 M/UL (ref 4.7–6.1)
SODIUM SERPL-SCNC: 139 MMOL/L (ref 135–145)
TRIGL SERPL-MCNC: 301 MG/DL (ref 0–149)
WBC # BLD AUTO: 4.9 K/UL (ref 4.8–10.8)

## 2021-09-15 PROCEDURE — 80061 LIPID PANEL: CPT

## 2021-09-15 PROCEDURE — 36415 COLL VENOUS BLD VENIPUNCTURE: CPT

## 2021-09-15 PROCEDURE — 99213 OFFICE O/P EST LOW 20 MIN: CPT | Performed by: NURSE PRACTITIONER

## 2021-09-15 PROCEDURE — 83036 HEMOGLOBIN GLYCOSYLATED A1C: CPT

## 2021-09-15 PROCEDURE — 85025 COMPLETE CBC W/AUTO DIFF WBC: CPT

## 2021-09-15 PROCEDURE — 80053 COMPREHEN METABOLIC PANEL: CPT

## 2021-09-15 ASSESSMENT — PATIENT HEALTH QUESTIONNAIRE - PHQ9: CLINICAL INTERPRETATION OF PHQ2 SCORE: 0

## 2021-09-15 ASSESSMENT — FIBROSIS 4 INDEX: FIB4 SCORE: 0.94

## 2021-09-15 NOTE — ASSESSMENT & PLAN NOTE
Reports some coughing while eating, mid chest.  Sometimes comes back up.  No difficulty swallowing. Has past hx of GERD.  Not currently on any medication.

## 2021-09-15 NOTE — ASSESSMENT & PLAN NOTE
Sister with diabetes.  Has had elevations of A1c in the past.  Overdue for labs as it has been over two years.  Discussed diet, exercise, weight, insulin resistance.

## 2021-09-15 NOTE — PROGRESS NOTES
"Chief Complaint   Patient presents with   • Prediabetes   • Cough     while eating    • Requesting Labs       Subjective:   Rakan Avalos is a 46 y.o. male here today for evaluation and management of:    Pharyngeal dysphagia  Reports some coughing while eating, mid chest.  Sometimes comes back up.  No difficulty swallowing. Has past hx of GERD.  Not currently on any medication.      Elevated triglycerides with high cholesterol  Past elevation of over 400.  Overdue for labs.      Prediabetes  Sister with diabetes.  Has had elevations of A1c in the past.  Overdue for labs as it has been over two years.  Discussed diet, exercise, weight, insulin resistance.             Current medicines (including changes today)  No current outpatient medications on file.     No current facility-administered medications for this visit.     He  has a past medical history of Diabetes (HCC), Elevated blood sugar, GERD (gastroesophageal reflux disease), and Hyperlipidemia.    ROS as stated in hpi  No chest pain, no shortness of breath, no abdominal pain       Objective:     /80 (BP Location: Left arm, Patient Position: Sitting)   Pulse (!) 56   Temp (!) 35.6 °C (96.1 °F) (Temporal)   Resp 16   Ht 1.702 m (5' 7\")   Wt 81.2 kg (179 lb)   SpO2 100%  Body mass index is 28.04 kg/m².   Physical Exam:  Constitutional: Alert, no distress.  Skin: Warm, dry, good turgor,no cyanosis, no rashes in visible areas.  Eye: Equal, round and reactive, conjunctiva clear, lids normal.  Ears: No tenderness, no discharge.  External canals are without any significant edema or erythema.  Tympanic membranes are without any inflammation, no effusion.  Gross auditory acuity is intact.  Nose: symmetrical without tenderness, no discharge.  Mouth/Throat: lips without lesion.  Oropharynx clear.  Throat without erythema, exudates or tonsillar enlargement.  Neck: Trachea midline, no masses, no obvious thyroid enlargement.. No cervical or supraclavicular " lymphadenopathy. Range of motion within normal limits.  Neuro: Cranial nerves 2-12 grossly intact.  No sensory deficit.  Respiratory: Unlabored respiratory effort, lungs clear to auscultation, no wheezes, no ronchi.  Cardiovascular: Normal S1, S2, no murmur, no edema.  Abdomen: Soft, non-tender, no masses, no guarding,  no hepatosplenomegaly.  Psych: Alert and oriented x3, normal affect and mood and judgement.        Assessment and Plan:   The following treatment plan was discussed    1. Pharyngeal dysphagia  Acute issue.  This may be a return of GERD.  Will try omeprazole 14 days.  Return in 2-3 weeks.  If not improved, will consider GI referral.     2. Prediabetes  Chronic issue.  Overdue for labs. Orders provided.  Return in 2-3 weeks for review.  Advised to watch fast foods, sugars, encouraged some walking.  Monitor and follow.   - CBC WITH DIFFERENTIAL; Future  - Comp Metabolic Panel; Future  - HEMOGLOBIN A1C; Future    3. Elevated triglycerides with high cholesterol  Chronic, ongoing. Overdue for labs.  See #2  - Lipid Profile; Future    4. Family history of diabetes mellitus  Sister with diabetes.  See #2.        Followup: No follow-ups on file.         Educated in proper administration of medication(s) ordered today including safety, possible SE, risks, benefits, rationale and alternatives to therapy.     Please note that this dictation was created using voice recognition software. I have made every reasonable attempt to correct obvious errors, but I expect that there are errors of grammar and possibly content that I did not discover before finalizing the note.

## 2021-09-30 ENCOUNTER — OFFICE VISIT (OUTPATIENT)
Dept: MEDICAL GROUP | Facility: PHYSICIAN GROUP | Age: 46
End: 2021-09-30
Payer: COMMERCIAL

## 2021-09-30 VITALS
RESPIRATION RATE: 16 BRPM | DIASTOLIC BLOOD PRESSURE: 78 MMHG | HEIGHT: 67 IN | OXYGEN SATURATION: 100 % | WEIGHT: 180 LBS | BODY MASS INDEX: 28.25 KG/M2 | SYSTOLIC BLOOD PRESSURE: 128 MMHG | HEART RATE: 55 BPM | TEMPERATURE: 95.7 F

## 2021-09-30 DIAGNOSIS — R73.03 PREDIABETES: ICD-10-CM

## 2021-09-30 DIAGNOSIS — E78.2 ELEVATED TRIGLYCERIDES WITH HIGH CHOLESTEROL: ICD-10-CM

## 2021-09-30 DIAGNOSIS — R13.13 PHARYNGEAL DYSPHAGIA: ICD-10-CM

## 2021-09-30 PROCEDURE — 99213 OFFICE O/P EST LOW 20 MIN: CPT | Performed by: NURSE PRACTITIONER

## 2021-09-30 ASSESSMENT — FIBROSIS 4 INDEX: FIB4 SCORE: 0.74

## 2021-09-30 NOTE — ASSESSMENT & PLAN NOTE
Results for APRYL DRAPER (MRN [id number]) as of 9/30/2021 07:32   Ref. Range 10/16/2019 08:32 9/15/2021 08:01   Glycohemoglobin Latest Ref Range: 4.0 - 5.6 % 6.3 (H) 6.0 (H)   Discussed pre-diabetes/insulin resistance.  Lifestyle goals discussed.  Recheck labs in 3 months.

## 2021-09-30 NOTE — PROGRESS NOTES
"Chief Complaint   Patient presents with   • Follow-Up   • Lab Results       Subjective:   Rakan Draper is a 46 y.o. male here today for evaluation and management of:    Prediabetes  Results for RAKAN DRAPER (MRN [id number]) as of 9/30/2021 07:32   Ref. Range 10/16/2019 08:32 9/15/2021 08:01   Glycohemoglobin Latest Ref Range: 4.0 - 5.6 % 6.3 (H) 6.0 (H)   Discussed pre-diabetes/insulin resistance.  Lifestyle goals discussed.  Recheck labs in 3 months.     Elevated triglycerides with high cholesterol  Results for RAKAN DRAPER (MRN [id number]) as of 9/30/2021 07:32   Ref. Range 9/15/2021 08:01   Cholesterol,Tot Latest Ref Range: 100 - 199 mg/dL 211 (H)   Triglycerides Latest Ref Range: 0 - 149 mg/dL 301 (H)   HDL Latest Ref Range: >=40 mg/dL 39 (A)   LDL Latest Ref Range: <100 mg/dL 112 (H)   Infrequent exercise.  Discussed lifestyle management, weight loss and dietary goals.  Recheck in 3 months    Pharyngeal dysphagia  Reports continued coughing whenever eating and some difficulty swallowing despite daily omeprazole.  No vomitting reported.  Will refer over to GI for evaluation.         Current medicines (including changes today)  No current outpatient medications on file.     No current facility-administered medications for this visit.     He  has a past medical history of Diabetes (HCC), Elevated blood sugar, GERD (gastroesophageal reflux disease), and Hyperlipidemia.    ROS as stated in hpi  No chest pain, no shortness of breath, no abdominal pain       Objective:     /78 (BP Location: Left arm, Patient Position: Sitting)   Pulse (!) 55   Temp (!) 35.4 °C (95.7 °F) (Temporal)   Resp 16   Ht 1.702 m (5' 7\")   Wt 81.6 kg (180 lb)   SpO2 100%  Body mass index is 28.19 kg/m².   Physical Exam:  Constitutional: Alert, no distress.  Skin: Warm, dry, good turgor,no cyanosis, no rashes in visible areas.  Eye: Equal, round and reactive, conjunctiva clear, lids normal.  Neck: Trachea midline, no masses, no obvious " thyroid enlargement.. No cervical or supraclavicular lymphadenopathy. Range of motion within normal limits.  Neuro: Cranial nerves 2-12 grossly intact.  No sensory deficit.  Respiratory: Unlabored respiratory effort,Psych: Alert and oriented x3, normal affect and mood and judgement.        Assessment and Plan:   The following treatment plan was discussed    1. Prediabetes  Chronic, ongoing. A1c 6.0.  Strong family history.  Discussed lifestyle goals:  Increase exercise to 150 minutes/week.  Decrease sugars, work on even a 5 pound weight loss.  Recheck labs in 3 months.  Monitor.     2. Elevated triglycerides with high cholesterol  Chronic, ongoing. Patient would like to really work on lowering these numbers with lifestyle changes.  Discussed diet, exercise, weight loss.  Return in 3 months.  Monitor.   - Comp Metabolic Panel; Future  - HEMOGLOBIN A1C; Future  - Lipid Profile; Future    3. Pharyngeal dysphagia  Acute issue. Has not responded to daily omeprazole.  Referral to GI placed. Continue omeprazole.  Monitor.   - REFERRAL TO GASTROENTEROLOGY      Followup: No follow-ups on file.         Educated in proper administration of medication(s) ordered today including safety, possible SE, risks, benefits, rationale and alternatives to therapy.     Please note that this dictation was created using voice recognition software. I have made every reasonable attempt to correct obvious errors, but I expect that there are errors of grammar and possibly content that I did not discover before finalizing the note.

## 2021-09-30 NOTE — ASSESSMENT & PLAN NOTE
Reports continued coughing whenever eating and some difficulty swallowing despite daily omeprazole.  No vomitting reported.  Will refer over to GI for evaluation.

## 2021-09-30 NOTE — ASSESSMENT & PLAN NOTE
Results for APRYL RDAPER (MRN [id number]) as of 9/30/2021 07:32   Ref. Range 9/15/2021 08:01   Cholesterol,Tot Latest Ref Range: 100 - 199 mg/dL 211 (H)   Triglycerides Latest Ref Range: 0 - 149 mg/dL 301 (H)   HDL Latest Ref Range: >=40 mg/dL 39 (A)   LDL Latest Ref Range: <100 mg/dL 112 (H)   Infrequent exercise.  Discussed lifestyle management, weight loss and dietary goals.  Recheck in 3 months

## 2021-11-28 ENCOUNTER — OFFICE VISIT (OUTPATIENT)
Dept: URGENT CARE | Facility: PHYSICIAN GROUP | Age: 46
End: 2021-11-28
Payer: COMMERCIAL

## 2021-11-28 VITALS
OXYGEN SATURATION: 99 % | RESPIRATION RATE: 16 BRPM | DIASTOLIC BLOOD PRESSURE: 70 MMHG | SYSTOLIC BLOOD PRESSURE: 118 MMHG | BODY MASS INDEX: 27.47 KG/M2 | WEIGHT: 175 LBS | TEMPERATURE: 97.1 F | HEIGHT: 67 IN | HEART RATE: 68 BPM

## 2021-11-28 DIAGNOSIS — H11.31 SUBCONJUNCTIVAL HEMORRHAGE OF RIGHT EYE: ICD-10-CM

## 2021-11-28 DIAGNOSIS — S05.01XA CONJUNCTIVAL ABRASION, RIGHT, INITIAL ENCOUNTER: ICD-10-CM

## 2021-11-28 PROCEDURE — 99213 OFFICE O/P EST LOW 20 MIN: CPT | Performed by: EMERGENCY MEDICINE

## 2021-11-28 RX ORDER — ERYTHROMYCIN 5 MG/G
1 OINTMENT OPHTHALMIC 3 TIMES DAILY
Qty: 1 G | Refills: 0 | Status: SHIPPED | OUTPATIENT
Start: 2021-11-28 | End: 2021-12-30

## 2021-11-28 ASSESSMENT — ENCOUNTER SYMPTOMS
FOREIGN BODY SENSATION: 1
PHOTOPHOBIA: 0
EYE DISCHARGE: 0
EYE REDNESS: 1
EYE ITCHING: 0
BLURRED VISION: 0

## 2021-11-28 ASSESSMENT — FIBROSIS 4 INDEX: FIB4 SCORE: 0.74

## 2021-11-29 NOTE — PROGRESS NOTES
"Subjective     Rakan Avalos is a 46 y.o. male who presents with Eye Pain (R eye pain onset last night )            Eye Problem   The right eye is affected. This is a new problem. The current episode started yesterday. Injury mechanism: flying wood particle. The pain is mild. He does not wear contacts. Associated symptoms include eye redness and a foreign body sensation. Pertinent negatives include no blurred vision, eye discharge, itching or photophobia.   Notes while cutting wood felt something in his right eye.  Subsequently had irritation symptoms.  Tetanus up-to-date.  No other trauma.    Review of Systems   Eyes: Positive for redness. Negative for blurred vision, photophobia, discharge and itching.              Objective     /70   Pulse 68   Temp 36.2 °C (97.1 °F) (Temporal)   Resp 16   Ht 1.702 m (5' 7\")   Wt 79.4 kg (175 lb)   SpO2 99%   BMI 27.41 kg/m²      Physical Exam  Constitutional:       General: He is not in acute distress.     Appearance: Normal appearance. He is well-developed.   HENT:      Head: Normocephalic.   Eyes:      General: Lids are everted, no foreign bodies appreciated.         Right eye: No foreign body, discharge or hordeolum.      Extraocular Movements: Extraocular movements intact.      Conjunctiva/sclera:      Right eye: Right conjunctiva is injected. Chemosis and hemorrhage present. No exudate.     Pupils: Pupils are equal, round, and reactive to light.      Right eye: Fluorescein uptake present. No corneal abrasion.     Neurological:      Mental Status: He is alert.   Psychiatric:         Behavior: Behavior is cooperative.                             Assessment & Plan        1. Subconjunctival hemorrhage of right eye  Avoid straining, heavy lifting until resolving    2. Conjunctival abrasion, right, initial encounter  - erythromycin 5 MG/GM Ointment; Apply 1 cm to right eye 3 times a day.  Dispense: 1 g; Refill: 0  Advised recheck if any increasing pain, vision " change

## 2021-11-29 NOTE — PATIENT INSTRUCTIONS
Hemorragia Subconjuntival  (Subconjunctival Hemorrhage)  Alcantara examen muestra que tiene zaire hemorragia subconjuntival. Es zaire acumulación de tonny que cubre zaire parte de la brigitte seda del rashida, y que no es de importancia. Kim trastorno puede deberse a zaire lesión o esfuerzo (nitesh al levantar peso, estornudar, o toser); a menudo no se puede conocer la causa. La hemorragia subconjuntival no causa dolor ni problemas de visión.  Kim trastorno no requiere ningún tratamiento. Llevará de 1 a 2 semanas para que la tonny se disuelva. Si nasim aspirina o coumadina a diario o si tiene andre presión arterial, deberá consultar con alcantara médico acerca de la necesidad de realizar un mayor tratamiento. Comuníquese con alcantara médico si tiene problemas de visión, dolor alrededor del rashida, u otra consulta acerca de alcantara trastorno.  Document Released: 10/14/2008 Document Revised: 03/11/2013  [employer] Patient Information ©2014 [employer].  Subconjunctival Hemorrhage  Your exam shows you have a subconjunctival hemorrhage. This is a harmless collection of blood covering a portion of the white of the eye. This condition may be due to injury or to straining (lifting, sneezing, or coughing). Often, there is no known cause. Subconjunctival blood does not cause pain or vision problems.  This condition needs no treatment. It will take 1 to 2 weeks for the blood to dissolve. If you take aspirin or Coumadin on a daily basis or if you have high blood pressure, you should check with your doctor about the need for further treatment. Please call your doctor if you have problems with your vision, pain around the eye, or any other concerns about your condition.  Document Released: 01/25/2006 Document Revised: 03/11/2013 Document Reviewed: 11/15/2010  [employer]® Patient Information ©2014 [employer].

## 2021-12-30 ENCOUNTER — OFFICE VISIT (OUTPATIENT)
Dept: MEDICAL GROUP | Facility: PHYSICIAN GROUP | Age: 46
End: 2021-12-30
Payer: COMMERCIAL

## 2021-12-30 VITALS
DIASTOLIC BLOOD PRESSURE: 78 MMHG | WEIGHT: 180 LBS | SYSTOLIC BLOOD PRESSURE: 124 MMHG | HEIGHT: 67 IN | HEART RATE: 56 BPM | OXYGEN SATURATION: 99 % | BODY MASS INDEX: 28.25 KG/M2 | TEMPERATURE: 98.6 F

## 2021-12-30 DIAGNOSIS — Z76.89 ESTABLISHING CARE WITH NEW DOCTOR, ENCOUNTER FOR: ICD-10-CM

## 2021-12-30 DIAGNOSIS — E78.2 ELEVATED TRIGLYCERIDES WITH HIGH CHOLESTEROL: ICD-10-CM

## 2021-12-30 DIAGNOSIS — R73.03 PREDIABETES: ICD-10-CM

## 2021-12-30 PROBLEM — Z83.3 FAMILY HISTORY OF DIABETES MELLITUS: Status: RESOLVED | Noted: 2021-09-15 | Resolved: 2021-12-30

## 2021-12-30 PROBLEM — E66.3 OVERWEIGHT (BMI 25.0-29.9): Status: ACTIVE | Noted: 2021-12-30

## 2021-12-30 PROCEDURE — 99213 OFFICE O/P EST LOW 20 MIN: CPT | Performed by: NURSE PRACTITIONER

## 2021-12-30 ASSESSMENT — FIBROSIS 4 INDEX: FIB4 SCORE: 0.74

## 2021-12-30 NOTE — ASSESSMENT & PLAN NOTE
New to examiner, ongoing problem for the patient.  Not currently on medication for this issue, reports that he was previously on medication in the past, metformin, but experienced dizziness with the medicine.  He prefers to make lifestyle changes including exercise, weight loss, and healthy diet.  Due for updated labs.

## 2021-12-30 NOTE — ASSESSMENT & PLAN NOTE
New problem to examiner.  Ongoing problem for the patient.  Not on medication for this issue.  Reports that he does not like to take medications and was able to make a significant improvement in the past with diet, exercise, weight loss, smaller portions.  He also had the support of his wife pushing him to do well.  After these changes he had lost weight and had improvement in his lipid profile, but reports that he has struggled with the healthy lifestyle changes over the holidays.  Due to repeat labs.

## 2021-12-31 ENCOUNTER — HOSPITAL ENCOUNTER (OUTPATIENT)
Dept: LAB | Facility: MEDICAL CENTER | Age: 46
End: 2021-12-31
Attending: NURSE PRACTITIONER
Payer: COMMERCIAL

## 2021-12-31 DIAGNOSIS — R73.03 PREDIABETES: ICD-10-CM

## 2021-12-31 DIAGNOSIS — E78.2 ELEVATED TRIGLYCERIDES WITH HIGH CHOLESTEROL: ICD-10-CM

## 2021-12-31 LAB
ALBUMIN SERPL BCP-MCNC: 4.6 G/DL (ref 3.2–4.9)
ALBUMIN/GLOB SERPL: 1.6 G/DL
ALP SERPL-CCNC: 62 U/L (ref 30–99)
ALT SERPL-CCNC: 17 U/L (ref 2–50)
ANION GAP SERPL CALC-SCNC: 9 MMOL/L (ref 7–16)
AST SERPL-CCNC: 16 U/L (ref 12–45)
BILIRUB SERPL-MCNC: 0.5 MG/DL (ref 0.1–1.5)
BUN SERPL-MCNC: 18 MG/DL (ref 8–22)
CALCIUM SERPL-MCNC: 9.4 MG/DL (ref 8.5–10.5)
CHLORIDE SERPL-SCNC: 102 MMOL/L (ref 96–112)
CHOLEST SERPL-MCNC: 214 MG/DL (ref 100–199)
CO2 SERPL-SCNC: 27 MMOL/L (ref 20–33)
CREAT SERPL-MCNC: 0.88 MG/DL (ref 0.5–1.4)
EST. AVERAGE GLUCOSE BLD GHB EST-MCNC: 140 MG/DL
FASTING STATUS PATIENT QL REPORTED: NORMAL
GLOBULIN SER CALC-MCNC: 2.8 G/DL (ref 1.9–3.5)
GLUCOSE SERPL-MCNC: 157 MG/DL (ref 65–99)
HBA1C MFR BLD: 6.5 % (ref 4–5.6)
HDLC SERPL-MCNC: 37 MG/DL
LDLC SERPL CALC-MCNC: 98 MG/DL
POTASSIUM SERPL-SCNC: 4.6 MMOL/L (ref 3.6–5.5)
PROT SERPL-MCNC: 7.4 G/DL (ref 6–8.2)
SODIUM SERPL-SCNC: 138 MMOL/L (ref 135–145)
TRIGL SERPL-MCNC: 393 MG/DL (ref 0–149)

## 2021-12-31 PROCEDURE — 80053 COMPREHEN METABOLIC PANEL: CPT

## 2021-12-31 PROCEDURE — 83036 HEMOGLOBIN GLYCOSYLATED A1C: CPT

## 2021-12-31 PROCEDURE — 36415 COLL VENOUS BLD VENIPUNCTURE: CPT

## 2021-12-31 PROCEDURE — 80061 LIPID PANEL: CPT

## 2021-12-31 NOTE — PROGRESS NOTES
CC:   Chief Complaint   Patient presents with   • Establish Care     NU2U     HISTORY OF THE PRESENT ILLNESS: Patient is a 46 y.o. male. This pleasant patient is here today to establish care and discuss multiple issues as listed below.    Health Maintenance: Complete.  Declines influenza vaccine.    Elevated triglycerides with high cholesterol  New problem to examiner.  Ongoing problem for the patient.  Not on medication for this issue.  Reports that he does not like to take medications and was able to make a significant improvement in the past with diet, exercise, weight loss, smaller portions.  He also had the support of his wife pushing him to do well.  After these changes he had lost weight and had improvement in his lipid profile, but reports that he has struggled with the healthy lifestyle changes over the holidays.  Due to repeat labs.    Prediabetes  New to examiner, ongoing problem for the patient.  Not currently on medication for this issue, reports that he was previously on medication in the past, metformin, but experienced dizziness with the medicine.  He prefers to make lifestyle changes including exercise, weight loss, and healthy diet.  Due for updated labs.    Allergies: Patient has no known allergies.  No current [facility]-ordered outpatient medications on file.     No current [facility]-ordered facility-administered medications on file.     Past Medical History:   Diagnosis Date   • Diabetes (HCC)     prediabetes   • Family history of diabetes mellitus 9/15/2021   • GERD (gastroesophageal reflux disease)    • Hyperlipidemia      History reviewed. No pertinent surgical history.  Social History     Tobacco Use   • Smoking status: Former Smoker     Packs/day: 0.10     Years: 25.00     Pack years: 2.50     Types: Cigarettes     Start date: 01/1986     Quit date: 12/30/2011     Years since quitting: 10.0   • Smokeless tobacco: Never Used   Vaping Use   • Vaping Use: Never used   Substance Use Topics   • Alcohol  "use: Yes     Comment: few drinks per month   • Drug use: No     Social History     Social History Narrative   • Not on file     Family History   Problem Relation Age of Onset   • Heart Disease Mother    • Hypertension Mother    • No Known Problems Father    • Diabetes Sister    • Diabetes Brother    • Diabetes Paternal Uncle    • No Known Problems Maternal Grandmother    • No Known Problems Maternal Grandfather    • No Known Problems Paternal Grandmother    • No Known Problems Paternal Grandfather    • No Known Problems Brother    • No Known Problems Brother      ROS:   Constitutional: No fevers, chills, malaise/fatigue.  Eyes: No eye pain.  ENT: No sore throat, congestion.   Resp: No cough, shortness of breath.  CV: No chest pain, leg swelling, palpitations.  GI: No nausea/vomiting, abdominal pain, constipation, diarrhea.  : No dysuria, hematuria.  MSK: No weakness.  Skin: No rashes.  Neuro: No dizziness, weakness, headaches.  Psych: No suicidal ideations.    All remaining systems reviewed and found to be negative, except as stated above.        Exam: /78 (BP Location: Left arm, Patient Position: Sitting, BP Cuff Size: Adult)   Pulse (!) 56   Temp 37 °C (98.6 °F) (Temporal)   Ht 1.702 m (5' 7\")   Wt 81.6 kg (180 lb)   SpO2 99%  Body mass index is 28.19 kg/m².    General: Well nourished, well developed male in NAD, awake and conversant.  Eyes: Normal conjunctiva, anicteric.  Round symmetrical pupils.  ENT: Hearing grossly intact.  No nasal discharge.  Neck: Neck is supple.  No masses or thyromegaly.  CV: No lower extremity edema.  Respiratory: Respirations are nonlabored.  No wheezing.  Abdomen: Non-Distended.  Skin: Warm.  No rashes or ulcers.  MSK: Normal ambulation.  No clubbing or cyanosis.  Neuro: Sensation and CN II-XII grossly normal.  Psych: Alert and oriented.  Cooperative, appropriate mood and affect, normal judgment.     Assessment/Plan:  1. Establishing care with new doctor, encounter " for    2. Elevated triglycerides with high cholesterol  3. Prediabetes  New to examiner, ongoing problem for the patient.  Due to repeat labs now and then will repeat labs in June 2022.  Encourage patient to continue with healthy lifestyle changes including healthy diet, smaller portions, regular exercise, weight loss.  Will message patient with lab results through [employer] and preorder labs for June 2022 prior to annual follow-up.  - Comp Metabolic Panel; Future  - HEMOGLOBIN A1C; Future  - Lipid Profile; Future    Educated in proper administration of medication(s) ordered today including safety, possible SE, risks, benefits, rationale and alternatives to therapy.   Supportive care, differential diagnoses, and indications for immediate follow-up discussed with patient.    Pathogenesis of diagnosis discussed including typical length and natural progression.    Instructed to return to clinic or nearest emergency department for any change in condition, further concerns, or worsening of symptoms.  Patient states understanding of the plan of care and discharge instructions.    Return in about 6 months (around 6/30/2022) for Preventative Annual, Follow up Labs.    Please note that this dictation was created using voice recognition software. I have made every reasonable attempt to correct obvious errors, but I expect that there are errors of grammar and possibly content that I did not discover before finalizing the note.

## 2022-01-03 DIAGNOSIS — E66.3 OVERWEIGHT (BMI 25.0-29.9): ICD-10-CM

## 2022-01-03 DIAGNOSIS — Z87.19 H/O GASTROESOPHAGEAL REFLUX (GERD): ICD-10-CM

## 2022-01-03 DIAGNOSIS — E78.2 ELEVATED TRIGLYCERIDES WITH HIGH CHOLESTEROL: ICD-10-CM

## 2022-01-03 DIAGNOSIS — Z00.00 ROUTINE HEALTH MAINTENANCE: ICD-10-CM

## 2022-01-03 DIAGNOSIS — E11.9 TYPE 2 DIABETES MELLITUS WITHOUT COMPLICATION, WITHOUT LONG-TERM CURRENT USE OF INSULIN (HCC): ICD-10-CM

## 2022-01-03 NOTE — PROGRESS NOTES
1. Elevated triglycerides with high cholesterol  - Comp Metabolic Panel; Future  - Lipid Profile; Future    2. H/O gastroesophageal reflux (GERD)  - CBC WITH DIFFERENTIAL; Future  - Comp Metabolic Panel; Future  - TSH WITH REFLEX TO FT4; Future    3. Overweight (BMI 25.0-29.9)  - CBC WITH DIFFERENTIAL; Future  - Comp Metabolic Panel; Future  - Lipid Profile; Future  - TSH WITH REFLEX TO FT4; Future    4. Type 2 diabetes mellitus without complication, without long-term current use of insulin (HCC)  - HEMOGLOBIN A1C; Future  - Comp Metabolic Panel; Future  - Lipid Profile; Future  - MICROALBUMIN CREAT RATIO URINE; Future    5. Routine health maintenance  - HEMOGLOBIN A1C; Future  - CBC WITH DIFFERENTIAL; Future  - Comp Metabolic Panel; Future  - Lipid Profile; Future  - MICROALBUMIN CREAT RATIO URINE; Future  - TSH WITH REFLEX TO FT4; Future

## 2022-03-10 ENCOUNTER — OFFICE VISIT (OUTPATIENT)
Dept: URGENT CARE | Facility: PHYSICIAN GROUP | Age: 47
End: 2022-03-10
Payer: COMMERCIAL

## 2022-03-10 VITALS
BODY MASS INDEX: 28.25 KG/M2 | SYSTOLIC BLOOD PRESSURE: 118 MMHG | HEIGHT: 67 IN | RESPIRATION RATE: 18 BRPM | OXYGEN SATURATION: 98 % | HEART RATE: 64 BPM | WEIGHT: 180 LBS | TEMPERATURE: 98.1 F | DIASTOLIC BLOOD PRESSURE: 62 MMHG

## 2022-03-10 DIAGNOSIS — J20.8 VIRAL BRONCHITIS: ICD-10-CM

## 2022-03-10 PROCEDURE — 99213 OFFICE O/P EST LOW 20 MIN: CPT | Performed by: PHYSICIAN ASSISTANT

## 2022-03-10 RX ORDER — ALBUTEROL SULFATE 90 UG/1
2 AEROSOL, METERED RESPIRATORY (INHALATION) EVERY 6 HOURS PRN
Qty: 8.5 G | Refills: 0 | Status: SHIPPED | OUTPATIENT
Start: 2022-03-10 | End: 2022-11-10

## 2022-03-10 RX ORDER — DEXTROMETHORPHAN HYDROBROMIDE AND PROMETHAZINE HYDROCHLORIDE 15; 6.25 MG/5ML; MG/5ML
5 SYRUP ORAL EVERY 4 HOURS PRN
Qty: 180 ML | Refills: 0 | Status: SHIPPED | OUTPATIENT
Start: 2022-03-10 | End: 2022-11-10

## 2022-03-10 RX ORDER — BENZONATATE 200 MG/1
200 CAPSULE ORAL 3 TIMES DAILY PRN
Qty: 30 CAPSULE | Refills: 0 | Status: SHIPPED | OUTPATIENT
Start: 2022-03-10 | End: 2022-11-10

## 2022-03-10 RX ORDER — FAMOTIDINE 40 MG/1
40 TABLET, FILM COATED ORAL EVERY EVENING
COMMUNITY
Start: 2022-01-11 | End: 2022-11-10

## 2022-03-10 RX ORDER — PANTOPRAZOLE SODIUM 40 MG/1
TABLET, DELAYED RELEASE ORAL
COMMUNITY
Start: 2022-01-10 | End: 2022-11-10

## 2022-03-10 ASSESSMENT — ENCOUNTER SYMPTOMS
SPUTUM PRODUCTION: 1
DIARRHEA: 0
WHEEZING: 0
COUGH: 1
MYALGIAS: 0
SORE THROAT: 0
HEADACHES: 0
HEMOPTYSIS: 0
VOMITING: 0
SHORTNESS OF BREATH: 1
ABDOMINAL PAIN: 0
PALPITATIONS: 0
FEVER: 0
BLURRED VISION: 0
CHILLS: 0
NAUSEA: 0
SINUS PAIN: 0
EYE PAIN: 0
DIZZINESS: 0

## 2022-03-10 ASSESSMENT — FIBROSIS 4 INDEX: FIB4 SCORE: 0.91

## 2022-04-19 PROBLEM — K22.2 LOWER ESOPHAGEAL RING (SCHATZKI): Status: ACTIVE | Noted: 2022-04-19

## 2022-04-27 PROBLEM — K21.00 GASTROESOPHAGEAL REFLUX DISEASE WITH ESOPHAGITIS WITHOUT HEMORRHAGE: Status: ACTIVE | Noted: 2017-04-07

## 2022-11-10 ENCOUNTER — OFFICE VISIT (OUTPATIENT)
Dept: MEDICAL GROUP | Facility: PHYSICIAN GROUP | Age: 47
End: 2022-11-10
Payer: COMMERCIAL

## 2022-11-10 VITALS
SYSTOLIC BLOOD PRESSURE: 130 MMHG | TEMPERATURE: 97.3 F | BODY MASS INDEX: 28.25 KG/M2 | HEIGHT: 67 IN | DIASTOLIC BLOOD PRESSURE: 80 MMHG | HEART RATE: 86 BPM | WEIGHT: 180 LBS | OXYGEN SATURATION: 98 %

## 2022-11-10 DIAGNOSIS — H61.23 BILATERAL IMPACTED CERUMEN: ICD-10-CM

## 2022-11-10 DIAGNOSIS — Z11.59 NEED FOR HEPATITIS C SCREENING TEST: ICD-10-CM

## 2022-11-10 PROBLEM — H92.02 LEFT EAR PAIN: Status: ACTIVE | Noted: 2022-11-10

## 2022-11-10 PROCEDURE — 99213 OFFICE O/P EST LOW 20 MIN: CPT | Performed by: NURSE PRACTITIONER

## 2022-11-10 ASSESSMENT — PATIENT HEALTH QUESTIONNAIRE - PHQ9: CLINICAL INTERPRETATION OF PHQ2 SCORE: 0

## 2022-11-10 ASSESSMENT — FIBROSIS 4 INDEX: FIB4 SCORE: 0.93

## 2022-11-11 NOTE — ASSESSMENT & PLAN NOTE
New to examiner.  Patient reports that he is experiencing bilateral ear fullness, but having pain in his left ear.  Denies sinus pain or congestion, headaches, fevers or chills.

## 2022-11-11 NOTE — PROGRESS NOTES
"CC:   Chief Complaint   Patient presents with    Cerumen Impaction     HISTORY OF THE PRESENT ILLNESS: Patient is a 47 y.o. male. This pleasant patient is here today to discuss bilateral ear fullness and left ear pain.    Health Maintenance: Reviewed, declines updating.    Left ear pain  New to examiner.  Patient reports that he is experiencing bilateral ear fullness, but having pain in his left ear.  Denies sinus pain or congestion, headaches, fevers or chills.     Allergies: Patient has no known allergies.  No current [facility]-ordered outpatient medications on file.     No current [facility]-ordered facility-administered medications on file.     Past Medical History:   Diagnosis Date    Diabetes (HCC)     prediabetes    Family history of diabetes mellitus 9/15/2021    GERD (gastroesophageal reflux disease)     Hyperlipidemia      History reviewed. No pertinent surgical history.  Social History     Tobacco Use    Smoking status: Former     Packs/day: 0.10     Years: 25.00     Pack years: 2.50     Types: Cigarettes     Start date: 01/1986     Quit date: 12/30/2011     Years since quitting: 10.8    Smokeless tobacco: Never   Vaping Use    Vaping Use: Never used   Substance Use Topics    Alcohol use: Yes     Comment: few drinks per month    Drug use: No     Social History     Social History Narrative    Not on file     Family History   Problem Relation Age of Onset    Heart Disease Mother     Hypertension Mother     No Known Problems Father     Diabetes Sister     Diabetes Brother     Diabetes Paternal Uncle     No Known Problems Maternal Grandmother     No Known Problems Maternal Grandfather     No Known Problems Paternal Grandmother     No Known Problems Paternal Grandfather     No Known Problems Brother     No Known Problems Brother      ROS:   See HPI      Exam: /80 (BP Location: Left arm, Patient Position: Sitting, BP Cuff Size: Adult)   Pulse 86   Temp 36.3 °C (97.3 °F) (Temporal)   Ht 1.702 m (5' 7\")   Wt 81.6 " kg (180 lb)   SpO2 98%  Body mass index is 28.19 kg/m².    General: Well nourished, well developed male in NAD, awake and conversant.  Eyes: Normal conjunctiva, anicteric.  Round symmetrical pupils.  ENT: Hearing grossly intact.  No nasal discharge.  Bilateral cerumen impaction.  Neck: Neck is supple.  No masses or thyromegaly.  CV: No lower extremity edema.  Respiratory: Respirations are nonlabored.  No wheezing.  Abdomen: Non-Distended.  Skin: Warm.  No rashes or ulcers.  MSK: Normal ambulation.  No clubbing or cyanosis.  Neuro: Sensation and CN II-XII grossly normal.  Psych: Alert and oriented.  Cooperative, appropriate mood and affect, normal judgment.      Procedure: Cerumen Removal -bilateral  Risks and benefits of procedure discussed with patient.  Cerumen removed with lavage after softening agent was instilled  Patient tolerated the procedure well  Pt educated about proper care of ear canal. Q-tip cleaning discouraged, use of debrox and warm water lavage discussed.  Post procedure exam with clear canal and normal TM.    Assessment/Plan:  1. Bilateral impacted cerumen  Cleared today.  - Ear Irrigation (MA Only); Future    2. Need for hepatitis C screening test  Due for screening.  - HCV Scrn ( [id number] 1xLife); Future     Educated in proper administration of medication(s) ordered today including safety, possible SE, risks, benefits, rationale and alternatives to therapy.   Supportive care, differential diagnoses, and indications for immediate follow-up discussed with patient.    Pathogenesis of diagnosis discussed including typical length and natural progression.    Instructed to return to clinic or nearest emergency department for any change in condition, further concerns, or worsening of symptoms.  Patient states understanding of the plan of care and discharge instructions.    Return for Follow up Labs.    I have placed the below orders and discussed them with an approved delegating provider. The MA is  performing the below orders under the direction of Dr. Reveles.     Please note that this dictation was created using voice recognition software. I have made every reasonable attempt to correct obvious errors, but I expect that there are errors of grammar and possibly content that I did not discover before finalizing the note.

## 2023-01-19 ENCOUNTER — OFFICE VISIT (OUTPATIENT)
Dept: URGENT CARE | Facility: PHYSICIAN GROUP | Age: 48
End: 2023-01-19
Payer: COMMERCIAL

## 2023-01-19 VITALS
RESPIRATION RATE: 18 BRPM | BODY MASS INDEX: 29.03 KG/M2 | OXYGEN SATURATION: 99 % | TEMPERATURE: 96.8 F | SYSTOLIC BLOOD PRESSURE: 122 MMHG | HEIGHT: 67 IN | DIASTOLIC BLOOD PRESSURE: 80 MMHG | WEIGHT: 185 LBS | HEART RATE: 60 BPM

## 2023-01-19 DIAGNOSIS — S61.211A LACERATION OF LEFT INDEX FINGER WITHOUT FOREIGN BODY WITHOUT DAMAGE TO NAIL, INITIAL ENCOUNTER: ICD-10-CM

## 2023-01-19 PROCEDURE — 12001 RPR S/N/AX/GEN/TRNK 2.5CM/<: CPT | Performed by: FAMILY MEDICINE

## 2023-01-19 ASSESSMENT — FIBROSIS 4 INDEX: FIB4 SCORE: 0.93

## 2023-01-19 ASSESSMENT — ENCOUNTER SYMPTOMS
MYALGIAS: 0
BRUISES/BLEEDS EASILY: 0

## 2023-01-19 NOTE — PROGRESS NOTES
"Subjective     Rakan Avalos is a 47 y.o. male who presents with Laceration (Cut right index finger yesterday cutting a piece of carpet )            Onset yesterday evening approximately 8 PM left index finger laceration to dorsal aspect with a carpet knife.  He continues to have some bleeding this morning after shoveling snow.  No function or sensory loss.  Tetanus is up-to-date.  Pain is mild.  No other aggravating alleviating factors.      Review of Systems   Musculoskeletal:  Negative for joint pain and myalgias.   Skin:  Negative for itching and rash.   Endo/Heme/Allergies:  Does not bruise/bleed easily.            Objective     /80   Pulse 60   Temp 36 °C (96.8 °F) (Temporal)   Resp 18   Ht 1.702 m (5' 7\")   Wt 83.9 kg (185 lb)   SpO2 99%   BMI 28.98 kg/m²      Physical Exam  Constitutional:       General: He is not in acute distress.     Appearance: He is well-developed.   Musculoskeletal:        Hands:    Skin:     General: Skin is warm and dry.      Findings: No rash.   Neurological:      Mental Status: He is alert.          Procedure: Laceration Repair  -Risks including bleeding, nerve damage, infection, and poor cosmetic outcome discussed. Benefits and alternatives discussed.   -Clean technique with sterile instruments  -No anesthesia  -Closed with Dermabond tissue adhesive with good wound approximation  -Dressing placed  -Patient tolerated well                    Assessment & Plan        1. Laceration of left index finger without foreign body without damage to nail, initial encounter          Differential diagnosis, natural history, supportive care, and indications for immediate follow-up were discussed.     Wound care, f/u prn        "

## 2023-03-29 ENCOUNTER — OFFICE VISIT (OUTPATIENT)
Dept: MEDICAL GROUP | Facility: PHYSICIAN GROUP | Age: 48
End: 2023-03-29
Payer: COMMERCIAL

## 2023-03-29 VITALS
DIASTOLIC BLOOD PRESSURE: 72 MMHG | WEIGHT: 180 LBS | HEART RATE: 57 BPM | RESPIRATION RATE: 16 BRPM | SYSTOLIC BLOOD PRESSURE: 110 MMHG | OXYGEN SATURATION: 99 % | HEIGHT: 67 IN | BODY MASS INDEX: 28.25 KG/M2 | TEMPERATURE: 96.4 F

## 2023-03-29 DIAGNOSIS — Z12.12 SCREENING FOR COLORECTAL CANCER: ICD-10-CM

## 2023-03-29 DIAGNOSIS — Z12.11 SCREENING FOR COLORECTAL CANCER: ICD-10-CM

## 2023-03-29 DIAGNOSIS — R05.1 ACUTE COUGH: ICD-10-CM

## 2023-03-29 DIAGNOSIS — K22.2 LOWER ESOPHAGEAL RING (SCHATZKI): ICD-10-CM

## 2023-03-29 PROCEDURE — 99214 OFFICE O/P EST MOD 30 MIN: CPT | Performed by: NURSE PRACTITIONER

## 2023-03-29 RX ORDER — FAMOTIDINE 40 MG/1
40 TABLET, FILM COATED ORAL NIGHTLY
Qty: 30 TABLET | Refills: 0 | Status: SHIPPED | OUTPATIENT
Start: 2023-03-29 | End: 2023-11-21

## 2023-03-29 RX ORDER — FLUTICASONE PROPIONATE 50 MCG
1 SPRAY, SUSPENSION (ML) NASAL DAILY
Qty: 16 G | Refills: 0 | Status: SHIPPED | OUTPATIENT
Start: 2023-03-29 | End: 2023-04-12

## 2023-03-29 RX ORDER — DEXTROMETHORPHAN HYDROBROMIDE AND PROMETHAZINE HYDROCHLORIDE 15; 6.25 MG/5ML; MG/5ML
5 SYRUP ORAL NIGHTLY
Qty: 118 ML | Refills: 0 | Status: SHIPPED | OUTPATIENT
Start: 2023-03-29 | End: 2023-11-21

## 2023-03-29 RX ORDER — BENZONATATE 100 MG/1
100 CAPSULE ORAL 3 TIMES DAILY PRN
Qty: 60 CAPSULE | Refills: 0 | Status: SHIPPED | OUTPATIENT
Start: 2023-03-29 | End: 2023-11-21

## 2023-03-29 ASSESSMENT — FIBROSIS 4 INDEX: FIB4 SCORE: 0.93

## 2023-03-29 ASSESSMENT — PATIENT HEALTH QUESTIONNAIRE - PHQ9: CLINICAL INTERPRETATION OF PHQ2 SCORE: 0

## 2023-03-29 NOTE — ASSESSMENT & PLAN NOTE
New to examiner.  Patient reports developed a nonproductive cough 2 weeks ago.  Also notes that his throat is dry and painful.  Denies sick contacts.  Denies ear pain, headache, allergies, postnasal drainage, fevers, chills.  Reports heartburn with a history of lower esophageal Schatzki ring, sniffling, and sneezing.  He has used over-the-counter NyQuil which helps some.  Reports that the cough is worse at night.

## 2023-03-29 NOTE — PROGRESS NOTES
CC: Diagnoses of Acute cough, Lower esophageal ring (Schatzki), and Screening for colorectal cancer were pertinent to this visit.                                                                                                                                       HPI:   Rakan presents today with the following concerns:    Acute cough  New to examiner.  Patient reports developed a nonproductive cough 2 weeks ago.  Also notes that his throat is dry and painful.  Denies sick contacts.  Denies ear pain, headache, allergies, postnasal drainage, fevers, chills.  Reports heartburn with a history of lower esophageal Schatzki ring, sniffling, and sneezing.  He has used over-the-counter NyQuil which helps some.  Reports that the cough is worse at night.    Patient Active Problem List    Diagnosis Date Noted    Acute cough 03/29/2023    Left ear pain 11/10/2022    Lower esophageal ring (Schatzki) 04/19/2022    Overweight (BMI 25.0-29.9) 12/30/2021    Pharyngeal dysphagia 09/15/2021    Elevated triglycerides with high cholesterol 09/15/2021    Osteochondroma of fibula, left 07/30/2019    Type 2 diabetes mellitus, without long-term current use of insulin ([facility]) 07/12/2017    Gastroesophageal reflux disease with esophagitis without hemorrhage 04/07/2017    Anxiety 04/07/2017     Current Outpatient Medications   Medication Sig Dispense Refill    fluticasone (FLONASE) 50 MCG/ACT nasal spray Administer 1 Spray into affected nostril(S) every day for 14 days. 16 g 0    famotidine (PEPCID) 40 MG Tab Take 1 Tablet by mouth every evening. 30 Tablet 0    benzonatate (TESSALON) 100 MG Cap Take 1 Capsule by mouth 3 times a day as needed for Cough. 60 Capsule 0    promethazine-dextromethorphan (PROMETHAZINE-DM) 6.25-15 MG/5ML syrup Take 5 mL by mouth every evening. 118 mL 0     No current facility-administered medications for this visit.     Allergies as of 03/29/2023    (No Known Allergies)      ROS:  See HPI    /72 (BP Location: Left  "arm, Patient Position: Sitting, BP Cuff Size: Adult)   Pulse (!) 57   Temp (!) 35.8 °C (96.4 °F) (Temporal)   Resp 16   Ht 1.702 m (5' 7\")   Wt 81.6 kg (180 lb)   SpO2 99%   BMI 28.19 kg/m²     Physical Exam:  General: Normal appearing. No distress.  HEENT: Normocephalic. Eyes conjunctiva clear lids without ptosis, pupils equal and reactive to light accommodation, ears normal shape and contour, canals are clear bilaterally, tympanic membranes are benign, nasal mucosa benign, oropharynx is without erythema, edema or exudates. Sinuses (frontal and maxillary) nontender to palpation.  Pulmonary: Dry cough.  Clear to ausculation.  Normal effort. No rales, rhonchi, or wheezing.  Cardiovascular: Regular rate and rhythm without murmur. Carotid and radial pulses are intact and equal bilaterally.  Neurologic: Grossly nonfocal.  Lymph: No cervical, supraclavicular or axillary lymph nodes are palpable.  Skin: Warm and dry.  No obvious lesions.  Musculoskeletal: Normal gait. No extremity cyanosis, clubbing, or edema.  Psych: Normal mood and affect. Alert and oriented x3. Judgment and insight is normal.     Assessment and Plan.   47 y.o. male with the following issues:  1. Acute cough  New to examiner, patient reports symptoms started 2 weeks ago.  Discussed with patient likely differential diagnoses of allergies and postnasal drainage or acid reflux considering patient's history of GERD and Schatzki ring.  Plan to have patient use Flonase nasal spray once daily for at least 2 weeks, famotidine 40 mg nightly for 30 days.  He may use benzonatate 100 mg 3 times daily as needed for cough and Promethazine DM 5 mL nightly as needed for cough.  Plan to follow-up if symptoms worsen or do not improve.  - fluticasone (FLONASE) 50 MCG/ACT nasal spray; Administer 1 Spray into affected nostril(S) every day for 14 days.  Dispense: 16 g; Refill: 0  - famotidine (PEPCID) 40 MG Tab; Take 1 Tablet by mouth every evening.  Dispense: 30 " Tablet; Refill: 0  - benzonatate (TESSALON) 100 MG Cap; Take 1 Capsule by mouth 3 times a day as needed for Cough.  Dispense: 60 Capsule; Refill: 0  - promethazine-dextromethorphan (PROMETHAZINE-DM) 6.25-15 MG/5ML syrup; Take 5 mL by mouth every evening.  Dispense: 118 mL; Refill: 0    2. Lower esophageal ring (Schatzki)  Chronic, ongoing.  Plan for patient to start famotidine 40 mg nightly for 30 days.  - famotidine (PEPCID) 40 MG Tab; Take 1 Tablet by mouth every evening.  Dispense: 30 Tablet; Refill: 0    3. Screening for colorectal cancer  Due for screening.  - Referral to GI for Colonoscopy     Return in about 1 month (around 4/29/2023), or if symptoms worsen or fail to improve.     Please note that this dictation was created using voice recognition software. I have worked with consultants from the vendor as well as technical experts from [facility] [employer] to optimize the interface. I have made every reasonable attempt to correct obvious errors, but I expect that there are errors of grammar and possibly content that I did not discover before finalizing the note.

## 2023-11-21 ENCOUNTER — OFFICE VISIT (OUTPATIENT)
Dept: MEDICAL GROUP | Facility: PHYSICIAN GROUP | Age: 48
End: 2023-11-21
Payer: COMMERCIAL

## 2023-11-21 VITALS
HEIGHT: 67 IN | HEART RATE: 66 BPM | BODY MASS INDEX: 28.09 KG/M2 | OXYGEN SATURATION: 97 % | TEMPERATURE: 98 F | WEIGHT: 179 LBS | DIASTOLIC BLOOD PRESSURE: 68 MMHG | SYSTOLIC BLOOD PRESSURE: 132 MMHG | RESPIRATION RATE: 12 BRPM

## 2023-11-21 DIAGNOSIS — E11.9 TYPE 2 DIABETES MELLITUS WITHOUT COMPLICATION, WITHOUT LONG-TERM CURRENT USE OF INSULIN (HCC): ICD-10-CM

## 2023-11-21 DIAGNOSIS — Z12.12 SCREENING FOR COLORECTAL CANCER: ICD-10-CM

## 2023-11-21 DIAGNOSIS — Z11.59 NEED FOR HEPATITIS C SCREENING TEST: ICD-10-CM

## 2023-11-21 DIAGNOSIS — Z11.4 SCREENING FOR HIV WITHOUT PRESENCE OF RISK FACTORS: ICD-10-CM

## 2023-11-21 DIAGNOSIS — Z23 NEED FOR VACCINATION: ICD-10-CM

## 2023-11-21 DIAGNOSIS — E66.3 OVERWEIGHT (BMI 25.0-29.9): ICD-10-CM

## 2023-11-21 DIAGNOSIS — E78.2 ELEVATED TRIGLYCERIDES WITH HIGH CHOLESTEROL: ICD-10-CM

## 2023-11-21 DIAGNOSIS — Z12.11 SCREENING FOR COLORECTAL CANCER: ICD-10-CM

## 2023-11-21 DIAGNOSIS — Z00.00 ROUTINE HEALTH MAINTENANCE: ICD-10-CM

## 2023-11-21 LAB
HBA1C MFR BLD: 6.7 % (ref ?–5.8)
POCT INT CON NEG: NEGATIVE
POCT INT CON POS: POSITIVE

## 2023-11-21 PROCEDURE — 83036 HEMOGLOBIN GLYCOSYLATED A1C: CPT | Performed by: NURSE PRACTITIONER

## 2023-11-21 PROCEDURE — 90677 PCV20 VACCINE IM: CPT | Performed by: NURSE PRACTITIONER

## 2023-11-21 PROCEDURE — 92250 FUNDUS PHOTOGRAPHY W/I&R: CPT | Mod: TC | Performed by: NURSE PRACTITIONER

## 2023-11-21 PROCEDURE — 3078F DIAST BP <80 MM HG: CPT | Performed by: NURSE PRACTITIONER

## 2023-11-21 PROCEDURE — 3075F SYST BP GE 130 - 139MM HG: CPT | Performed by: NURSE PRACTITIONER

## 2023-11-21 PROCEDURE — 90471 IMMUNIZATION ADMIN: CPT | Performed by: NURSE PRACTITIONER

## 2023-11-21 PROCEDURE — 99214 OFFICE O/P EST MOD 30 MIN: CPT | Mod: 25 | Performed by: NURSE PRACTITIONER

## 2023-11-21 NOTE — ASSESSMENT & PLAN NOTE
Chronic, ongoing.  Last hemoglobin A1c 6.5% in December 2021, due for repeat A1c today.  He was prescribed metformin in the past, took the medication for about 1 month but was experiencing dizziness that affected his driving.  He would prefer to work on lifestyle changes and recheck labs in 6 months.

## 2023-11-22 NOTE — ASSESSMENT & PLAN NOTE
Chronic, ongoing.  Not on medication for this issue, reports that he does not like to take medications and would rather make lifestyle changes to improve his labs.  Last labs completed in December 2021.

## 2023-11-22 NOTE — PROGRESS NOTES
CC: Diagnoses of Type 2 diabetes mellitus without complication, without long-term current use of insulin (HCC), Elevated triglycerides with high cholesterol, Overweight (BMI 25.0-29.9), Screening for colorectal cancer, Routine health maintenance, Screening for HIV without presence of risk factors, Need for hepatitis C screening test, and Need for vaccination were pertinent to this visit.                                                                                                                                       HPI:   Rakan presents today with the following concerns:    Type 2 diabetes mellitus, without long-term current use of insulin (HCC)  Chronic, ongoing.  Last hemoglobin A1c 6.5% in December 2021, due for repeat A1c today.  He was prescribed metformin in the past, took the medication for about 1 month but was experiencing dizziness that affected his driving.  He would prefer to work on lifestyle changes and recheck labs in 6 months.    Elevated triglycerides with high cholesterol  Chronic, ongoing.  Not on medication for this issue, reports that he does not like to take medications and would rather make lifestyle changes to improve his labs.  Last labs completed in December 2021.    Patient Active Problem List    Diagnosis Date Noted    Acute cough 03/29/2023    Left ear pain 11/10/2022    Lower esophageal ring (Schatzki) 04/19/2022    Overweight (BMI 25.0-29.9) 12/30/2021    Pharyngeal dysphagia 09/15/2021    Elevated triglycerides with high cholesterol 09/15/2021    Osteochondroma of fibula, left 07/30/2019    Type 2 diabetes mellitus, without long-term current use of insulin (HCC) 07/12/2017    Gastroesophageal reflux disease with esophagitis without hemorrhage 04/07/2017    Anxiety 04/07/2017     No current outpatient medications on file.     No current facility-administered medications for this visit.     Allergies as of 11/21/2023    (No Known Allergies)      ROS:  Constitutional: No fevers,  "chills, malaise/fatigue.  Eyes: No eye pain.  ENT: No sore throat, congestion.   Resp: No cough, shortness of breath.  CV: No chest pain, leg swelling, palpitations.  GI: No nausea/vomiting, abdominal pain, constipation, diarrhea.  : No dysuria, hematuria.  MSK: No weakness.  Skin: No rashes.  Neuro: No dizziness, weakness, headaches.  Psych: No suicidal ideations.    All remaining systems reviewed and found to be negative, except as stated above.      /68   Pulse 66   Temp 36.7 °C (98 °F) (Temporal)   Resp 12   Ht 1.702 m (5' 7\")   Wt 81.2 kg (179 lb)   SpO2 97%   BMI 28.04 kg/m²     Physical Exam:  General: Well nourished, well developed male in NAD, awake and conversant.  Eyes: Normal conjunctiva, anicteric.  Round symmetrical pupils.  ENT: Hearing grossly intact.  No nasal discharge.  Neck: Neck is supple.  No masses or thyromegaly.  CV: No lower extremity edema.  Respiratory: Respirations are nonlabored.  No wheezing.  Abdomen: Non-Distended.  Skin: Warm.  No rashes or ulcers.  MSK: Normal ambulation.  No clubbing or cyanosis.  Neuro: Sensation and CN II-XII grossly normal.  Psych: Alert and oriented.  Cooperative, appropriate mood and affect, normal judgment.      Assessment and Plan.   48 y.o. male with the following issues:  1. Type 2 diabetes mellitus without complication, without long-term current use of insulin (HCC)  Chronic, ongoing without medication.  POCT A1c in clinic today 6.7%, up from 6.5% in December 2021.  Patient would like to work on lifestyle changes and follow-up with labs in 6 months.  Retinal screening completed today, Prevnar 20 vaccine given today.  Due for updated annual labs prior to annual follow-up in May 2024.  - POCT  A1C  - HEMOGLOBIN A1C; Future  - Comp Metabolic Panel; Future  - Lipid Profile; Future  - MICROALBUMIN CREAT RATIO URINE; Future  - Pneumococcal Conjugate Vaccine 20-Valent (6 wks+)  - POCT Retinal Eye Exam    2. Elevated triglycerides with high " cholesterol  Chronic, ongoing without medication.  Patient would like to work on lifestyle changes and follow-up in 6 months. Due for updated annual labs prior to annual follow-up in May 2024.  - Comp Metabolic Panel; Future  - Lipid Profile; Future  - TSH WITH REFLEX TO FT4; Future    3. Overweight (BMI 25.0-29.9)  Chronic, ongoing.  Encourage diet high in fruits, vegetables, and fiber. And a diet low in salt, refined carbohydrates, cholesterol, saturated fat, and trans fatty acids.    Encourage a minimum of 30 minutes of moderate intensity aerobic exercise (eg, brisk walking) is recommended on five days each week. Or 30 minutes of vigorous-intensity aerobic exercise (eg, jogging) on three days each week.   Patient's body mass index is 28.04 kg/m². Exercise and nutrition counseling were performed at this visit.  Due for updated annual labs prior to annual follow-up in May 2024.  - CBC WITH DIFFERENTIAL; Future  - Comp Metabolic Panel; Future  - Lipid Profile; Future  - TSH WITH REFLEX TO FT4; Future    4. Screening for colorectal cancer  Due for screening.  - Referral to GI for Colonoscopy    5. Routine health maintenance  Due for updated annual labs prior to annual follow-up in May 2024.  - HEMOGLOBIN A1C; Future  - CBC WITH DIFFERENTIAL; Future  - Comp Metabolic Panel; Future  - Lipid Profile; Future  - MICROALBUMIN CREAT RATIO URINE; Future  - TSH WITH REFLEX TO FT4; Future  - HEP C VIRUS ANTIBODY; Future  - HIV AG/AB COMBO ASSAY SCREENING; Future    6. Screening for HIV without presence of risk factors  Due for one-time screening.  - HIV AG/AB COMBO ASSAY SCREENING; Future    7. Need for hepatitis C screening test  Due for one-time screening.  - HEP C VIRUS ANTIBODY; Future    8. Need for vaccination  Given today.  - Pneumococcal Conjugate Vaccine 20-Valent (6 wks+)     I have placed the below orders and discussed them with an approved delegating provider. The MA is performing the below orders under the  direction of Dr. Reveles.      Return in about 6 months (around 5/30/2024) for Preventative Annual, Follow up Labs.     Please note that this dictation was created using voice recognition software. I have worked with consultants from the vendor as well as technical experts from [facility] to optimize the interface. I have made every reasonable attempt to correct obvious errors, but I expect that there are errors of grammar and possibly content that I did not discover before finalizing the note.

## 2023-12-11 LAB — RETINAL SCREEN: NEGATIVE

## 2024-06-07 SDOH — HEALTH STABILITY: PHYSICAL HEALTH: ON AVERAGE, HOW MANY DAYS PER WEEK DO YOU ENGAGE IN MODERATE TO STRENUOUS EXERCISE (LIKE A BRISK WALK)?: 4 DAYS

## 2024-06-07 SDOH — ECONOMIC STABILITY: FOOD INSECURITY: WITHIN THE PAST 12 MONTHS, THE FOOD YOU BOUGHT JUST DIDN'T LAST AND YOU DIDN'T HAVE MONEY TO GET MORE.: NEVER TRUE

## 2024-06-07 SDOH — ECONOMIC STABILITY: TRANSPORTATION INSECURITY
IN THE PAST 12 MONTHS, HAS LACK OF TRANSPORTATION KEPT YOU FROM MEETINGS, WORK, OR FROM GETTING THINGS NEEDED FOR DAILY LIVING?: NO

## 2024-06-07 SDOH — HEALTH STABILITY: MENTAL HEALTH
STRESS IS WHEN SOMEONE FEELS TENSE, NERVOUS, ANXIOUS, OR CAN'T SLEEP AT NIGHT BECAUSE THEIR MIND IS TROUBLED. HOW STRESSED ARE YOU?: NOT AT ALL

## 2024-06-07 SDOH — ECONOMIC STABILITY: HOUSING INSECURITY: IN THE LAST 12 MONTHS, HOW MANY PLACES HAVE YOU LIVED?: 1

## 2024-06-07 SDOH — ECONOMIC STABILITY: FOOD INSECURITY: WITHIN THE PAST 12 MONTHS, YOU WORRIED THAT YOUR FOOD WOULD RUN OUT BEFORE YOU GOT MONEY TO BUY MORE.: NEVER TRUE

## 2024-06-07 SDOH — ECONOMIC STABILITY: TRANSPORTATION INSECURITY
IN THE PAST 12 MONTHS, HAS THE LACK OF TRANSPORTATION KEPT YOU FROM MEDICAL APPOINTMENTS OR FROM GETTING MEDICATIONS?: NO

## 2024-06-07 SDOH — ECONOMIC STABILITY: HOUSING INSECURITY
IN THE LAST 12 MONTHS, WAS THERE A TIME WHEN YOU DID NOT HAVE A STEADY PLACE TO SLEEP OR SLEPT IN A SHELTER (INCLUDING NOW)?: NO

## 2024-06-07 SDOH — ECONOMIC STABILITY: INCOME INSECURITY: IN THE LAST 12 MONTHS, WAS THERE A TIME WHEN YOU WERE NOT ABLE TO PAY THE MORTGAGE OR RENT ON TIME?: NO

## 2024-06-07 SDOH — HEALTH STABILITY: PHYSICAL HEALTH: ON AVERAGE, HOW MANY MINUTES DO YOU ENGAGE IN EXERCISE AT THIS LEVEL?: 30 MIN

## 2024-06-07 SDOH — ECONOMIC STABILITY: TRANSPORTATION INSECURITY
IN THE PAST 12 MONTHS, HAS LACK OF RELIABLE TRANSPORTATION KEPT YOU FROM MEDICAL APPOINTMENTS, MEETINGS, WORK OR FROM GETTING THINGS NEEDED FOR DAILY LIVING?: NO

## 2024-06-07 SDOH — ECONOMIC STABILITY: INCOME INSECURITY: HOW HARD IS IT FOR YOU TO PAY FOR THE VERY BASICS LIKE FOOD, HOUSING, MEDICAL CARE, AND HEATING?: NOT HARD AT ALL

## 2024-06-07 ASSESSMENT — SOCIAL DETERMINANTS OF HEALTH (SDOH)
IN A TYPICAL WEEK, HOW MANY TIMES DO YOU TALK ON THE PHONE WITH FAMILY, FRIENDS, OR NEIGHBORS?: MORE THAN THREE TIMES A WEEK
HOW OFTEN DO YOU GET TOGETHER WITH FRIENDS OR RELATIVES?: ONCE A WEEK
HOW OFTEN DO YOU GET TOGETHER WITH FRIENDS OR RELATIVES?: ONCE A WEEK
HOW OFTEN DO YOU HAVE SIX OR MORE DRINKS ON ONE OCCASION: NEVER
DO YOU BELONG TO ANY CLUBS OR ORGANIZATIONS SUCH AS CHURCH GROUPS UNIONS, FRATERNAL OR ATHLETIC GROUPS, OR SCHOOL GROUPS?: NO
HOW MANY DRINKS CONTAINING ALCOHOL DO YOU HAVE ON A TYPICAL DAY WHEN YOU ARE DRINKING: 1 OR 2
HOW OFTEN DO YOU ATTENT MEETINGS OF THE CLUB OR ORGANIZATION YOU BELONG TO?: NEVER
HOW HARD IS IT FOR YOU TO PAY FOR THE VERY BASICS LIKE FOOD, HOUSING, MEDICAL CARE, AND HEATING?: NOT HARD AT ALL
HOW OFTEN DO YOU ATTEND CHURCH OR RELIGIOUS SERVICES?: MORE THAN 4 TIMES PER YEAR
WITHIN THE PAST 12 MONTHS, YOU WORRIED THAT YOUR FOOD WOULD RUN OUT BEFORE YOU GOT THE MONEY TO BUY MORE: NEVER TRUE
HOW OFTEN DO YOU ATTENT MEETINGS OF THE CLUB OR ORGANIZATION YOU BELONG TO?: NEVER
HOW OFTEN DO YOU HAVE A DRINK CONTAINING ALCOHOL: 2-4 TIMES A MONTH
DO YOU BELONG TO ANY CLUBS OR ORGANIZATIONS SUCH AS CHURCH GROUPS UNIONS, FRATERNAL OR ATHLETIC GROUPS, OR SCHOOL GROUPS?: NO
HOW OFTEN DO YOU ATTEND CHURCH OR RELIGIOUS SERVICES?: MORE THAN 4 TIMES PER YEAR
IN A TYPICAL WEEK, HOW MANY TIMES DO YOU TALK ON THE PHONE WITH FAMILY, FRIENDS, OR NEIGHBORS?: MORE THAN THREE TIMES A WEEK

## 2024-06-07 ASSESSMENT — LIFESTYLE VARIABLES
HOW OFTEN DO YOU HAVE SIX OR MORE DRINKS ON ONE OCCASION: NEVER
SKIP TO QUESTIONS 9-10: 1
HOW MANY STANDARD DRINKS CONTAINING ALCOHOL DO YOU HAVE ON A TYPICAL DAY: 1 OR 2
AUDIT-C TOTAL SCORE: 2
HOW OFTEN DO YOU HAVE A DRINK CONTAINING ALCOHOL: 2-4 TIMES A MONTH

## 2024-06-08 ENCOUNTER — HOSPITAL ENCOUNTER (OUTPATIENT)
Dept: LAB | Facility: MEDICAL CENTER | Age: 49
End: 2024-06-08
Attending: NURSE PRACTITIONER
Payer: COMMERCIAL

## 2024-06-08 DIAGNOSIS — E66.3 OVERWEIGHT (BMI 25.0-29.9): ICD-10-CM

## 2024-06-08 DIAGNOSIS — E11.9 TYPE 2 DIABETES MELLITUS WITHOUT COMPLICATION, WITHOUT LONG-TERM CURRENT USE OF INSULIN (HCC): ICD-10-CM

## 2024-06-08 DIAGNOSIS — Z11.4 SCREENING FOR HIV WITHOUT PRESENCE OF RISK FACTORS: ICD-10-CM

## 2024-06-08 DIAGNOSIS — E78.2 ELEVATED TRIGLYCERIDES WITH HIGH CHOLESTEROL: ICD-10-CM

## 2024-06-08 DIAGNOSIS — Z11.59 NEED FOR HEPATITIS C SCREENING TEST: ICD-10-CM

## 2024-06-08 DIAGNOSIS — Z00.00 ROUTINE HEALTH MAINTENANCE: ICD-10-CM

## 2024-06-08 LAB
ALBUMIN SERPL BCP-MCNC: 4.4 G/DL (ref 3.2–4.9)
ALBUMIN/GLOB SERPL: 1.6 G/DL
ALP SERPL-CCNC: 65 U/L (ref 30–99)
ALT SERPL-CCNC: 19 U/L (ref 2–50)
ANION GAP SERPL CALC-SCNC: 9 MMOL/L (ref 7–16)
AST SERPL-CCNC: 17 U/L (ref 12–45)
BASOPHILS # BLD AUTO: 0.7 % (ref 0–1.8)
BASOPHILS # BLD: 0.03 K/UL (ref 0–0.12)
BILIRUB SERPL-MCNC: 0.4 MG/DL (ref 0.1–1.5)
BUN SERPL-MCNC: 16 MG/DL (ref 8–22)
CALCIUM ALBUM COR SERPL-MCNC: 9 MG/DL (ref 8.5–10.5)
CALCIUM SERPL-MCNC: 9.3 MG/DL (ref 8.5–10.5)
CHLORIDE SERPL-SCNC: 102 MMOL/L (ref 96–112)
CHOLEST SERPL-MCNC: 215 MG/DL (ref 100–199)
CO2 SERPL-SCNC: 25 MMOL/L (ref 20–33)
CREAT SERPL-MCNC: 0.87 MG/DL (ref 0.5–1.4)
EOSINOPHIL # BLD AUTO: 0.13 K/UL (ref 0–0.51)
EOSINOPHIL NFR BLD: 2.9 % (ref 0–6.9)
ERYTHROCYTE [DISTWIDTH] IN BLOOD BY AUTOMATED COUNT: 38.3 FL (ref 35.9–50)
EST. AVERAGE GLUCOSE BLD GHB EST-MCNC: 160 MG/DL
FASTING STATUS PATIENT QL REPORTED: NORMAL
GFR SERPLBLD CREATININE-BSD FMLA CKD-EPI: 105 ML/MIN/1.73 M 2
GLOBULIN SER CALC-MCNC: 2.7 G/DL (ref 1.9–3.5)
GLUCOSE SERPL-MCNC: 189 MG/DL (ref 65–99)
HBA1C MFR BLD: 7.2 % (ref 4–5.6)
HCT VFR BLD AUTO: 44.7 % (ref 42–52)
HCV AB SER QL: NORMAL
HDLC SERPL-MCNC: 28 MG/DL
HGB BLD-MCNC: 16.2 G/DL (ref 14–18)
HIV 1+2 AB+HIV1 P24 AG SERPL QL IA: NORMAL
IMM GRANULOCYTES # BLD AUTO: 0.01 K/UL (ref 0–0.11)
IMM GRANULOCYTES NFR BLD AUTO: 0.2 % (ref 0–0.9)
LDLC SERPL CALC-MCNC: ABNORMAL MG/DL
LYMPHOCYTES # BLD AUTO: 0.99 K/UL (ref 1–4.8)
LYMPHOCYTES NFR BLD: 21.9 % (ref 22–41)
MCH RBC QN AUTO: 32.1 PG (ref 27–33)
MCHC RBC AUTO-ENTMCNC: 36.2 G/DL (ref 32.3–36.5)
MCV RBC AUTO: 88.5 FL (ref 81.4–97.8)
MONOCYTES # BLD AUTO: 0.3 K/UL (ref 0–0.85)
MONOCYTES NFR BLD AUTO: 6.6 % (ref 0–13.4)
NEUTROPHILS # BLD AUTO: 3.06 K/UL (ref 1.82–7.42)
NEUTROPHILS NFR BLD: 67.7 % (ref 44–72)
NRBC # BLD AUTO: 0.02 K/UL
NRBC BLD-RTO: 0.4 /100 WBC (ref 0–0.2)
PLATELET # BLD AUTO: 208 K/UL (ref 164–446)
PMV BLD AUTO: 11.2 FL (ref 9–12.9)
POTASSIUM SERPL-SCNC: 5.1 MMOL/L (ref 3.6–5.5)
PROT SERPL-MCNC: 7.1 G/DL (ref 6–8.2)
RBC # BLD AUTO: 5.05 M/UL (ref 4.7–6.1)
SODIUM SERPL-SCNC: 136 MMOL/L (ref 135–145)
TRIGL SERPL-MCNC: 659 MG/DL (ref 0–149)
TSH SERPL DL<=0.005 MIU/L-ACNC: 2.42 UIU/ML (ref 0.38–5.33)
WBC # BLD AUTO: 4.5 K/UL (ref 4.8–10.8)

## 2024-06-08 PROCEDURE — 82043 UR ALBUMIN QUANTITATIVE: CPT

## 2024-06-08 PROCEDURE — 87389 HIV-1 AG W/HIV-1&-2 AB AG IA: CPT

## 2024-06-08 PROCEDURE — 83036 HEMOGLOBIN GLYCOSYLATED A1C: CPT

## 2024-06-08 PROCEDURE — 85025 COMPLETE CBC W/AUTO DIFF WBC: CPT

## 2024-06-08 PROCEDURE — 80053 COMPREHEN METABOLIC PANEL: CPT

## 2024-06-08 PROCEDURE — 36415 COLL VENOUS BLD VENIPUNCTURE: CPT

## 2024-06-08 PROCEDURE — 86803 HEPATITIS C AB TEST: CPT

## 2024-06-08 PROCEDURE — 80061 LIPID PANEL: CPT

## 2024-06-08 PROCEDURE — 84443 ASSAY THYROID STIM HORMONE: CPT

## 2024-06-08 PROCEDURE — 82570 ASSAY OF URINE CREATININE: CPT

## 2024-06-09 LAB
CREAT UR-MCNC: 113.49 MG/DL
MICROALBUMIN UR-MCNC: <1.2 MG/DL
MICROALBUMIN/CREAT UR: NORMAL MG/G (ref 0–30)

## 2024-06-10 ENCOUNTER — APPOINTMENT (OUTPATIENT)
Dept: MEDICAL GROUP | Facility: PHYSICIAN GROUP | Age: 49
End: 2024-06-10
Payer: COMMERCIAL

## 2024-06-10 VITALS
BODY MASS INDEX: 29.05 KG/M2 | DIASTOLIC BLOOD PRESSURE: 74 MMHG | WEIGHT: 185.1 LBS | SYSTOLIC BLOOD PRESSURE: 112 MMHG | HEART RATE: 78 BPM | HEIGHT: 67 IN | OXYGEN SATURATION: 96 % | TEMPERATURE: 98.6 F

## 2024-06-10 DIAGNOSIS — Z00.01 ENCOUNTER FOR WELL ADULT EXAM WITH ABNORMAL FINDINGS: ICD-10-CM

## 2024-06-10 DIAGNOSIS — E78.2 ELEVATED TRIGLYCERIDES WITH HIGH CHOLESTEROL: ICD-10-CM

## 2024-06-10 DIAGNOSIS — E11.9 TYPE 2 DIABETES MELLITUS WITHOUT COMPLICATION, WITHOUT LONG-TERM CURRENT USE OF INSULIN (HCC): ICD-10-CM

## 2024-06-10 PROBLEM — R05.1 ACUTE COUGH: Status: RESOLVED | Noted: 2023-03-29 | Resolved: 2024-06-10

## 2024-06-10 PROBLEM — H92.02 LEFT EAR PAIN: Status: RESOLVED | Noted: 2022-11-10 | Resolved: 2024-06-10

## 2024-06-10 PROCEDURE — 99396 PREV VISIT EST AGE 40-64: CPT | Performed by: NURSE PRACTITIONER

## 2024-06-10 PROCEDURE — 3078F DIAST BP <80 MM HG: CPT | Performed by: NURSE PRACTITIONER

## 2024-06-10 PROCEDURE — 99214 OFFICE O/P EST MOD 30 MIN: CPT | Mod: 25 | Performed by: NURSE PRACTITIONER

## 2024-06-10 PROCEDURE — 3074F SYST BP LT 130 MM HG: CPT | Performed by: NURSE PRACTITIONER

## 2024-06-10 RX ORDER — FENOFIBRATE 145 MG/1
145 TABLET, COATED ORAL DAILY
Qty: 90 TABLET | Refills: 1 | Status: SHIPPED | OUTPATIENT
Start: 2024-06-10 | End: 2024-06-17 | Stop reason: SDUPTHER

## 2024-06-10 ASSESSMENT — PATIENT HEALTH QUESTIONNAIRE - PHQ9: CLINICAL INTERPRETATION OF PHQ2 SCORE: 0

## 2024-06-10 ASSESSMENT — FIBROSIS 4 INDEX: FIB4 SCORE: 0.94

## 2024-06-10 NOTE — PROGRESS NOTES
Subjective:   CC:   Chief Complaint   Patient presents with    Annual Exam    Lab Results     Verbal consent was acquired by the patient to use [employer] ambient listening note generation during this visit Yes    HPI:   Rakan Avalos is a 48 y.o. male who presents for annual exam:    History of Present Illness  He reports infrequent foot self-examinations and denies experiencing significant numbness or tingling in his feet. However, he does experience a burning sensation in both feet, particularly in the mornings when he utilizes a different type of boot at work. The burning sensation intensifies after returning home from work, following a shower and changing his regular shoes.    The patient has made dietary modifications, including daily 30-minute walks with his wife and dog, and increased consumption of salads, meat, quinoa, and greasy foods. He has increased his consumption of natural juice and has abstained from soda. He expresses surprise regarding his elevated triglyceride levels. He has been consuming 4 to 5 shots of alcohol every weekend for the past 2 months. He was previously prescribed metformin for his diabetes, but discontinued it due to side effects. He has not previously taken any cholesterol-lowering medications.    Supplemental Information  He is up-to-date on his dental and eye exams. He had a colon cancer screening in 01/2024, with the recall in January 2031. He was told to eat more fiber. He declined STD testing. He denies any ear pain. He had wax removal in 12/2023. He uses earbuds or talks on the phone mostly on his right ear. He denies any difficulty swallowing. He denies any chest pain or shortness of breath. He denies any constipation or diarrhea. He noticed abdominal bloating last week. He denies any problems with urination.  Denies blood in his stool or urine.   He is  and feels safe in his relationship. He wears a seatbelt in the car. He wears sunscreen when he is outside. He is  a former smoker.     Anticipatory Guidance:  Cholesterol screenin2024   LDL controlled: Total cholesterol 215, triglycerides 659, HDL 28  Diabetes screenin2024   A1c controlled: 7.2%   UALB/CR: WNL  Retinal exam: 2024  Monofilament: Completed today  Diet: Recommend more lean meats, fruits, vegetables, whole grains.   Exercise: Encourage regular exercise.   Substance abuse: No   Safe in relationship: Yes  Seatbelts, bike/motorcycle helmet: Yes  Sun protection: Yes  Dentist: Up to date   Eye doctor: Up to date     Cancer Screening:  Colorectal cancer screenin2024, 7-10 year repeat colonoscopy    Infectious Disease Screening/Immunizations:  STI screening: Declines  Chlamydia/Gonorrhea screening: DEclines  Hep C screenin2024  HIV screen: 2024  Practices safe sex: Yes    Immunizations:   Influenza: Out of season   Tetanus: 10/12/2017   Hep B: Declines   Pneumonia: 2023   Shingrix: Recommended    RSV: N/A, due at age 58   Received HPV series: Aged out    Preventative Care Screening:   Osteoporosis Screening: NA  Tobacco Screening: Former smoker   AAA Screening: NA    He  reports being sexually active and has had partner(s) who are female. He reports using the following method of birth control/protection: Condom.  He  has a past medical history of Diabetes (HCC), Family history of diabetes mellitus (9/15/2021), GERD (gastroesophageal reflux disease), and Hyperlipidemia.  He  has no past surgical history on file.    Family History   Problem Relation Age of Onset    Heart Disease Mother     Hypertension Mother     No Known Problems Father     Diabetes Sister     Diabetes Brother     Diabetes Paternal Uncle     No Known Problems Maternal Grandmother     No Known Problems Maternal Grandfather     No Known Problems Paternal Grandmother     No Known Problems Paternal Grandfather     No Known Problems Brother     No Known Problems Brother      Social History     Tobacco Use    Smoking  status: Former     Current packs/day: 0.00     Average packs/day: 0.1 packs/day for 26.0 years (2.6 ttl pk-yrs)     Types: Cigarettes     Start date: 1986     Quit date: 2011     Years since quittin.4    Smokeless tobacco: Never   Vaping Use    Vaping status: Never Used   Substance Use Topics    Alcohol use: Yes     Alcohol/week: 3.0 oz     Types: 5 Shots of liquor per week    Drug use: No     Patient Active Problem List    Diagnosis Date Noted    Lower esophageal ring (Schatzki) 2022    Overweight (BMI 25.0-29.9) 2021    Pharyngeal dysphagia 09/15/2021    Elevated triglycerides with high cholesterol 09/15/2021    Osteochondroma of fibula, left 2019    Type 2 diabetes mellitus, without long-term current use of insulin ([facility]) 2017    Gastroesophageal reflux disease with esophagitis without hemorrhage 2017    Anxiety 2017     Current Outpatient Medications   Medication Sig Dispense Refill    fenofibrate (TRICOR) 145 MG Tab Take 1 Tablet by mouth every day. 90 Tablet 1    Empagliflozin (JARDIANCE) 10 MG Tab tablet Take 1 Tablet by mouth every day. 90 Tablet 1     No current facility-administered medications for this visit.     No Known Allergies    Review of Systems   Constitutional: Negative for fever, chills and malaise/fatigue.   HENT: Negative for congestion.    Eyes: Negative for pain.   Respiratory: Negative for cough and shortness of breath.    Cardiovascular: Negative for chest pain and leg swelling.   Gastrointestinal: Negative for nausea, vomiting, abdominal pain and diarrhea.   Genitourinary: Negative for dysuria and hematuria.   Skin: Negative for rash.   Neurological: Negative for dizziness, focal weakness and headaches.   Endo/Heme/Allergies: Does not bruise/bleed easily.   Psychiatric/Behavioral: Negative for depression.  The patient is not nervous/anxious.      Objective:   /74 (BP Location: Left arm, Patient Position: Sitting, BP Cuff Size: Adult)  "  Pulse 78   Temp 37 °C (98.6 °F) (Temporal)   Ht 1.702 m (5' 7\")   Wt 84 kg (185 lb 1.6 oz)   SpO2 96%   BMI 28.99 kg/m²     Wt Readings from Last 4 Encounters:   06/10/24 84 kg (185 lb 1.6 oz)   11/21/23 81.2 kg (179 lb)   03/29/23 81.6 kg (180 lb)   01/19/23 83.9 kg (185 lb)     Physical Exam:  Constitutional: Well-developed and well-nourished. Not diaphoretic. No distress.   Skin: Skin is warm and dry. No rash noted.  Head: Atraumatic without lesions.  Eyes: Conjunctivae and extraocular motions are normal. Pupils are equal, round, and reactive to light. No scleral icterus.   Ears:  External ears unremarkable. Tympanic membranes clear and intact.  Right ear with moderate cerumen.  Nose: Nares patent. Septum midline. Turbinates without erythema nor edema. No discharge.   Mouth/Throat: Tongue normal. Oropharynx is clear and moist. Posterior pharynx without erythema or exudates.  Neck: Supple, trachea midline. Normal range of motion. No thyromegaly present. No lymphadenopathy--cervical or supraclavicular.  Cardiovascular: Regular rate and rhythm, S1 and S2 without murmur, rubs, or gallops.    Respiratory: Effort normal. Clear to auscultation throughout. No adventitious sounds.   Abdomen: Soft, non tender, and without distention. Active bowel sounds in all four quadrants. No rebound, guarding.  Extremities: No cyanosis, clubbing, erythema, nor edema. Radial pulses intact and symmetric.   Musculoskeletal: All major joints AROM full in all directions without pain.  Neurological: Alert and oriented x 3. Grossly non-focal. Strength and sensation grossly intact.   Psychiatric:  Behavior, mood, and affect are appropriate.    Monofilament testing with a 10 gram force: sensation intact: decreased on right  Visual Inspection: Feet without maceration, ulcers, fissures.  Pedal pulses: intact bilaterally     Assessment and Plan:   1. Encounter for well adult exam with abnormal findings    2. Type 2 diabetes mellitus " without complication, without long-term current use of insulin (HCC)  Chronic, uncontrolled.  Did not tolerate metformin in the past.  Start empagliflozin 10 mg daily.  Follow-up in 4 months to repeat A1c in clinic.  Monofilament completed today.  - Diabetic Monofilament LE Exam  - Empagliflozin (JARDIANCE) 10 MG Tab tablet; Take 1 Tablet by mouth every day.  Dispense: 90 Tablet; Refill: 1    3. Elevated triglycerides with high cholesterol  Chronic, uncontrolled.  The patient will be initiated on fenofibrate, to be taken once daily. He has been advised to limit his alcohol consumption. Additionally, he has been advised to increase his physical activity and reduce his intake of processed foods, sugars, fried foods, animal products, red meat, and pork. His diet should include fish, turkey, and chicken.  - fenofibrate (TRICOR) 145 MG Tab; Take 1 Tablet by mouth every day.  Dispense: 90 Tablet; Refill: 1     4.  Cerumen impaction right  The patient has been advised to use Debrox to soften the wax.     Health maintenance: Up to date   Labs per orders  Immunizations: Declines   Patient counseled about skin care, diet, supplements, and exercise.  Discussed  adequate intake of calcium and vitamin D, diet and exercise, colorectal cancer screening, decrease alcohol intake.     Follow-up: Return in about 4 months (around 10/10/2024) for Diabetes, A1C in Clinic.     Please note that this dictation was created using voice recognition software. I have worked with consultants from the vendor as well as technical experts from [employer] to optimize the interface. I have made every reasonable attempt to correct obvious errors, but I expect that there are errors of grammar and possibly content that I did not discover before finalizing the note.

## 2024-06-17 DIAGNOSIS — E11.9 TYPE 2 DIABETES MELLITUS WITHOUT COMPLICATION, WITHOUT LONG-TERM CURRENT USE OF INSULIN (HCC): ICD-10-CM

## 2024-06-17 DIAGNOSIS — E78.2 ELEVATED TRIGLYCERIDES WITH HIGH CHOLESTEROL: ICD-10-CM

## 2024-06-17 NOTE — TELEPHONE ENCOUNTER
Received request via: Patient    Was the patient seen in the last year in this department? Yes    Does the patient have an active prescription (recently filled or refills available) for medication(s) requested?  Pt needs to change pharmacy    [facility]    Does the patient have [facility] Plus and need 100 day supply (blood pressure, diabetes and cholesterol meds only)? Patient does not have SCP

## 2024-06-18 RX ORDER — FENOFIBRATE 145 MG/1
145 TABLET, COATED ORAL DAILY
Qty: 90 TABLET | Refills: 3 | Status: SHIPPED | OUTPATIENT
Start: 2024-06-18

## 2024-06-18 NOTE — TELEPHONE ENCOUNTER
Requested Prescriptions     Pending Prescriptions Disp Refills    Empagliflozin (JARDIANCE) 10 MG Tab tablet 90 Tablet 3     Sig: Take 1 Tablet by mouth every day.    fenofibrate (TRICOR) 145 MG Tab 90 Tablet 3     Sig: Take 1 Tablet by mouth every day.       NAIF Brown.

## 2024-10-14 ENCOUNTER — APPOINTMENT (OUTPATIENT)
Dept: MEDICAL GROUP | Facility: PHYSICIAN GROUP | Age: 49
End: 2024-10-14
Payer: COMMERCIAL

## 2024-10-16 ENCOUNTER — HOSPITAL ENCOUNTER (OUTPATIENT)
Dept: LAB | Facility: MEDICAL CENTER | Age: 49
End: 2024-10-16
Attending: NURSE PRACTITIONER
Payer: COMMERCIAL

## 2024-10-16 ENCOUNTER — OFFICE VISIT (OUTPATIENT)
Dept: MEDICAL GROUP | Facility: PHYSICIAN GROUP | Age: 49
End: 2024-10-16
Payer: COMMERCIAL

## 2024-10-16 VITALS
HEIGHT: 67 IN | BODY MASS INDEX: 26.46 KG/M2 | DIASTOLIC BLOOD PRESSURE: 66 MMHG | WEIGHT: 168.6 LBS | HEART RATE: 50 BPM | OXYGEN SATURATION: 98 % | SYSTOLIC BLOOD PRESSURE: 112 MMHG | TEMPERATURE: 96.8 F

## 2024-10-16 DIAGNOSIS — E78.2 ELEVATED TRIGLYCERIDES WITH HIGH CHOLESTEROL: ICD-10-CM

## 2024-10-16 DIAGNOSIS — E11.9 TYPE 2 DIABETES MELLITUS WITHOUT COMPLICATION, WITHOUT LONG-TERM CURRENT USE OF INSULIN (HCC): ICD-10-CM

## 2024-10-16 LAB
CHOLEST SERPL-MCNC: 158 MG/DL (ref 100–199)
EST. AVERAGE GLUCOSE BLD GHB EST-MCNC: 131 MG/DL
HBA1C MFR BLD: 6.2 % (ref 4–5.6)
HDLC SERPL-MCNC: 53 MG/DL
LDLC SERPL CALC-MCNC: 93 MG/DL
TRIGL SERPL-MCNC: 58 MG/DL (ref 0–149)

## 2024-10-16 PROCEDURE — 83036 HEMOGLOBIN GLYCOSYLATED A1C: CPT

## 2024-10-16 PROCEDURE — 80061 LIPID PANEL: CPT

## 2024-10-16 PROCEDURE — 36415 COLL VENOUS BLD VENIPUNCTURE: CPT

## 2024-10-16 PROCEDURE — 3074F SYST BP LT 130 MM HG: CPT | Performed by: NURSE PRACTITIONER

## 2024-10-16 PROCEDURE — 3078F DIAST BP <80 MM HG: CPT | Performed by: NURSE PRACTITIONER

## 2024-10-16 PROCEDURE — 99214 OFFICE O/P EST MOD 30 MIN: CPT | Performed by: NURSE PRACTITIONER

## 2024-10-16 ASSESSMENT — FIBROSIS 4 INDEX: FIB4 SCORE: 0.96

## 2024-12-20 ENCOUNTER — OFFICE VISIT (OUTPATIENT)
Dept: MEDICAL GROUP | Facility: CLINIC | Age: 49
End: 2024-12-20
Payer: COMMERCIAL

## 2024-12-20 ENCOUNTER — NON-PROVIDER VISIT (OUTPATIENT)
Dept: MEDICAL GROUP | Facility: PHYSICIAN GROUP | Age: 49
End: 2024-12-20
Payer: COMMERCIAL

## 2024-12-20 VITALS
TEMPERATURE: 96.9 F | HEIGHT: 68 IN | BODY MASS INDEX: 25.16 KG/M2 | OXYGEN SATURATION: 98 % | HEART RATE: 62 BPM | DIASTOLIC BLOOD PRESSURE: 77 MMHG | SYSTOLIC BLOOD PRESSURE: 120 MMHG | WEIGHT: 166 LBS

## 2024-12-20 DIAGNOSIS — Z02.89 HISTORY AND PHYSICAL EXAMINATION, IMMIGRATION: ICD-10-CM

## 2024-12-20 DIAGNOSIS — Z23 NEED FOR VACCINATION: ICD-10-CM

## 2024-12-20 PROCEDURE — 3074F SYST BP LT 130 MM HG: CPT | Performed by: FAMILY MEDICINE

## 2024-12-20 PROCEDURE — 99202 OFFICE O/P NEW SF 15 MIN: CPT | Performed by: FAMILY MEDICINE

## 2024-12-20 PROCEDURE — 3078F DIAST BP <80 MM HG: CPT | Performed by: FAMILY MEDICINE

## 2024-12-20 ASSESSMENT — FIBROSIS 4 INDEX: FIB4 SCORE: 0.96

## 2024-12-20 NOTE — PROGRESS NOTES
This pleasant patient is here for an immigration physical. Rakan moved here originally from [place] and has been in the  for 30 years. 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   No concerning or unusual childhood illnesses. Surgical history No  Other PMHx includes DM 2   .   Current medications include Jardiance and fenofibrate  Social HX:  no cigarettes, no alcohol, no other drugs (including marijuana).  There is no history of drug addiction or alcoholism.  NKDA  FHx: significant for 2 brothers with diabetes (type 2)  Denies any history of STD, mental illness,  or exposure to TB    Exam:    Gen: Well developed, well nourished in no acute distress.   Skin:  Warm  and dry  HEENT: conjunctiva non-injected, sclera non-icteric. EOMs intact.   Pinna normal.    Oral mucous membranes pink and moist with no lesions.  Neck: Supple, trachea midline. No adenopathy or masses in the neck or supraclavicular regions.  Lungs: Effort is normal. Clear to auscultation bilaterally with good excursion.  CV: regular rate and rhythm, no murmurs  Abdomen: soft, nontender, + BS. No HSM.  No CVAT  Ext: no edema, color normal, vascularity normal, temperature normal  Alert and oriented Eye contact is good, speech goal directed, affect calm    A/P  Immigration exam  Ordered appropriate labs (Quant TB, RPR, GC)  Will refer to [facility] for  any needed immunizations.

## 2024-12-23 ENCOUNTER — NON-PROVIDER VISIT (OUTPATIENT)
Dept: MEDICAL GROUP | Facility: PHYSICIAN GROUP | Age: 49
End: 2024-12-23
Payer: COMMERCIAL

## 2024-12-23 DIAGNOSIS — Z23 NEED FOR VACCINATION: ICD-10-CM

## 2024-12-23 PROCEDURE — 90656 IIV3 VACC NO PRSV 0.5 ML IM: CPT | Performed by: NURSE PRACTITIONER

## 2024-12-23 PROCEDURE — 90471 IMMUNIZATION ADMIN: CPT | Performed by: NURSE PRACTITIONER

## 2024-12-23 PROCEDURE — 90472 IMMUNIZATION ADMIN EACH ADD: CPT | Performed by: NURSE PRACTITIONER

## 2024-12-23 PROCEDURE — 90746 HEPB VACCINE 3 DOSE ADULT IM: CPT | Mod: JZ | Performed by: NURSE PRACTITIONER

## 2024-12-23 NOTE — PROGRESS NOTES
"Rakan Avalos is a 49 y.o. male here for a non-provider visit for:   FLU  HEPATITIS B 1 of 3    Reason for immunization: needed for work/school  Immunization records indicate need for vaccine: Yes, confirmed with Epic  Minimum interval has been met for this vaccine: Yes  ABN completed: Yes    VIS Dated  05/12/2023 was given to patient: Yes  All IAC Questionnaire questions were answered \"No.\"    Patient tolerated injection and no adverse effects were observed or reported: Yes    Pt scheduled for next dose in series: Yes  "

## 2025-01-16 ENCOUNTER — OFFICE VISIT (OUTPATIENT)
Dept: MEDICAL GROUP | Facility: PHYSICIAN GROUP | Age: 50
End: 2025-01-16
Payer: COMMERCIAL

## 2025-01-16 VITALS
HEART RATE: 56 BPM | BODY MASS INDEX: 26.4 KG/M2 | WEIGHT: 168.2 LBS | OXYGEN SATURATION: 99 % | HEIGHT: 67 IN | DIASTOLIC BLOOD PRESSURE: 68 MMHG | TEMPERATURE: 96.9 F | SYSTOLIC BLOOD PRESSURE: 116 MMHG

## 2025-01-16 DIAGNOSIS — E78.2 ELEVATED TRIGLYCERIDES WITH HIGH CHOLESTEROL: ICD-10-CM

## 2025-01-16 DIAGNOSIS — E11.9 TYPE 2 DIABETES MELLITUS WITHOUT COMPLICATION, WITHOUT LONG-TERM CURRENT USE OF INSULIN (HCC): ICD-10-CM

## 2025-01-16 DIAGNOSIS — E66.3 OVERWEIGHT (BMI 25.0-29.9): ICD-10-CM

## 2025-01-16 DIAGNOSIS — Z00.00 ROUTINE HEALTH MAINTENANCE: ICD-10-CM

## 2025-01-16 PROBLEM — Z02.89 HISTORY AND PHYSICAL EXAMINATION, IMMIGRATION: Status: RESOLVED | Noted: 2024-12-20 | Resolved: 2025-01-16

## 2025-01-16 LAB
HBA1C MFR BLD: 6.6 % (ref ?–5.8)
POCT INT CON NEG: NEGATIVE
POCT INT CON POS: POSITIVE

## 2025-01-16 PROCEDURE — 92250 FUNDUS PHOTOGRAPHY W/I&R: CPT | Mod: 26 | Performed by: NURSE PRACTITIONER

## 2025-01-16 PROCEDURE — 99214 OFFICE O/P EST MOD 30 MIN: CPT | Performed by: NURSE PRACTITIONER

## 2025-01-16 PROCEDURE — 3078F DIAST BP <80 MM HG: CPT | Performed by: NURSE PRACTITIONER

## 2025-01-16 PROCEDURE — 83036 HEMOGLOBIN GLYCOSYLATED A1C: CPT | Performed by: NURSE PRACTITIONER

## 2025-01-16 PROCEDURE — 3074F SYST BP LT 130 MM HG: CPT | Performed by: NURSE PRACTITIONER

## 2025-01-16 RX ORDER — GLIPIZIDE 10 MG/1
10 TABLET ORAL DAILY
Qty: 100 TABLET | Refills: 3 | Status: SHIPPED | OUTPATIENT
Start: 2025-01-16 | End: 2026-02-20

## 2025-01-16 ASSESSMENT — PATIENT HEALTH QUESTIONNAIRE - PHQ9: CLINICAL INTERPRETATION OF PHQ2 SCORE: 0

## 2025-01-16 ASSESSMENT — FIBROSIS 4 INDEX: FIB4 SCORE: 0.96

## 2025-01-20 LAB — RETINAL SCREEN: NEGATIVE

## 2025-01-20 NOTE — PROGRESS NOTES
Verbal consent was acquired by the patient to use [employer] ambient listening note generation during this visit Yes      Ren Avalos is a 49 y.o. male who presents for:  History of Present Illness  The patient presents for evaluation of diabetes mellitus.    He has not made any significant dietary modifications recently. He attributes his improved health status to the efficacy of his current medication regimen. However, he expresses concern about the high cost of Jardiance, which amounts to $ 1200 for a 90-day supply. He is seeking alternatives due to financial constraints. He recalls a previous trial of metformin several years ago, which was discontinued due to adverse effects, including dizziness. He reports no similar issues with Jardiance. His insurance coverage for medications is limited, necessitating out-of-pocket expenses. He does not monitor his blood glucose levels regularly but did so a few months prior. He has been adhering to a regimen of one Jardiance tablet daily but is considering reducing the dosage to extend the supply. He reports no history of heart failure. He continues to abstain from soda and alcohol consumption.    He is scheduled to receive his second dose of the hepatitis B vaccine on Monday. He is also due for a varicella vaccine as part of his immigration process. He inquires about the possibility of receiving both vaccines on the same day.    SOCIAL HISTORY  He does not drink alcohol.    MEDICATIONS  Current: Jardiance, fenofibrate  Past: Metformin    ROS:  Constitutional: No fevers, chills, malaise/fatigue.  Eyes: No eye pain.  ENT: No sore throat, congestion.   Resp: No cough, shortness of breath.  CV: No chest pain, leg swelling, palpitations.  GI: No nausea/vomiting, abdominal pain, constipation, diarrhea.  : No dysuria, hematuria.  MSK: No weakness.  Skin: No rashes.  Neuro: No dizziness, weakness, headaches.  Psych: No suicidal ideations.    All remaining systems  "reviewed and found to be negative, except as stated above.      Objective   /68 (BP Location: Right arm, Patient Position: Sitting, BP Cuff Size: Adult)   Pulse (!) 56   Temp 36.1 °C (96.9 °F) (Temporal)   Ht 1.702 m (5' 7\")   Wt 76.3 kg (168 lb 3.2 oz)   SpO2 99%   Physical Exam  General: Well nourished, well developed male in NAD, awake and conversant.  Eyes: Normal conjunctiva, anicteric.  Round symmetrical pupils.  ENT: Hearing grossly intact.  No nasal discharge.  Neck: Neck is supple.  No masses or thyromegaly.  CV: No lower extremity edema.  Respiratory: Respirations are nonlabored.  No wheezing.  Abdomen: Non-Distended.  Skin: Warm.  No rashes or ulcers.  MSK: Normal ambulation.  No clubbing or cyanosis.  Neuro: Sensation and CN II-XII grossly normal.  Psych: Alert and oriented.  Cooperative, appropriate mood and affect, normal judgment.      Results  Laboratory Studies  A1c was 7.2 in June 2024 and dropped to 6.2 in October. A1c today is 6.6.    Assessment & Plan  1. Type 2 diabetes mellitus without complication, without long-term current use of insulin (HCC)  Chronic, ongoing. His A1c levels have shown a significant improvement, decreasing from 7.2 in June 2024 to 6.2 in October 2024. However, due to the high cost of Jardiance, an alternative treatment plan is necessary. Today, his A1c is 6.6. He will discontinue Jardiance after using up his current supply and start glipizide 10 mg, to be taken once daily with food. He is advised to carry sweets such as candy or orange juice to manage potential hypoglycemic episodes characterized by symptoms such as sweating, weakness, confusion, or wobbliness. A comprehensive set of laboratory tests, including cholesterol, urine, thyroid, A1c, CBC, and CMP, will be conducted. He is instructed to fast for 10 to 12 hours prior to these tests, which should be completed at least one week before his next appointment. If there is no significant improvement in his A1c " levels after 6 months, a referral to pharmacotherapy will be considered. Retinal screening completed today.  - POCT Retinal Eye Exam  - POCT  A1C  - HEMOGLOBIN A1C; Future  - CBC WITH DIFFERENTIAL; Future  - MICROALBUMIN CREAT RATIO URINE; Future  - glipiZIDE (GLUCOTROL) 10 MG Tab; Take 1 Tablet by mouth every day.  Dispense: 100 Tablet; Refill: 3    2. Elevated triglycerides with high cholesterol  Chronic, ongoing. Continue fenofibrate 145 mg daily. Due for updated annual labs prior to annual follow up in July 2025.  - Comp Metabolic Panel; Future  - Lipid Profile; Future  - TSH WITH REFLEX TO FT4; Future    3. Overweight (BMI 25.0-29.9)  Chronic, ongoing.  Encourage diet high in fruits, vegetables, and fiber. And a diet low in salt, refined carbohydrates, cholesterol, saturated fat, and trans fatty acids.    Encourage a minimum of 30 minutes of moderate intensity aerobic exercise (eg, brisk walking) is recommended on five days each week. Or 30 minutes of vigorous-intensity aerobic exercise (eg, jogging) on three days each week.   Patient's body mass index is 26.34 kg/m². Exercise and nutrition counseling were performed at this visit.  Due for updated annual labs prior to annual follow up in July 2025.  - CBC WITH DIFFERENTIAL; Future  - Comp Metabolic Panel; Future  - Lipid Profile; Future  - TSH WITH REFLEX TO FT4; Future    4. Routine health maintenance  His next hepatitis B vaccine is due on 01/20/2025. He is advised to take the varicella vaccine as scheduled for immigration and return for the hepatitis B vaccine on Monday.  - HEMOGLOBIN A1C; Future  - CBC WITH DIFFERENTIAL; Future  - Comp Metabolic Panel; Future  - Lipid Profile; Future  - MICROALBUMIN CREAT RATIO URINE; Future  - TSH WITH REFLEX TO FT4; Future     Return in about 6 months (around 7/16/2025) for Preventative Annual, Diabetes, Follow up Labs, Follow up Medications.     I have placed the below orders and discussed them with an approved  delegating provider. The MA is performing the below orders under the direction of Dr. Esquivel.      Please note that this dictation was created using voice recognition software. I have made every reasonable attempt to correct obvious errors, but I expect that there are errors of grammar and possibly content that I did not discover before finalizing the note.

## 2025-07-12 ENCOUNTER — HOSPITAL ENCOUNTER (OUTPATIENT)
Dept: LAB | Facility: MEDICAL CENTER | Age: 50
End: 2025-07-12
Attending: NURSE PRACTITIONER
Payer: COMMERCIAL

## 2025-07-12 DIAGNOSIS — E78.2 ELEVATED TRIGLYCERIDES WITH HIGH CHOLESTEROL: ICD-10-CM

## 2025-07-12 DIAGNOSIS — Z00.00 ROUTINE HEALTH MAINTENANCE: ICD-10-CM

## 2025-07-12 DIAGNOSIS — E66.3 OVERWEIGHT (BMI 25.0-29.9): ICD-10-CM

## 2025-07-12 DIAGNOSIS — E11.9 TYPE 2 DIABETES MELLITUS WITHOUT COMPLICATION, WITHOUT LONG-TERM CURRENT USE OF INSULIN (HCC): ICD-10-CM

## 2025-07-12 LAB
ALBUMIN SERPL BCP-MCNC: 4.3 G/DL (ref 3.2–4.9)
ALBUMIN/GLOB SERPL: 1.6 G/DL
ALP SERPL-CCNC: 50 U/L (ref 30–99)
ALT SERPL-CCNC: 26 U/L (ref 2–50)
ANION GAP SERPL CALC-SCNC: 10 MMOL/L (ref 7–16)
AST SERPL-CCNC: 19 U/L (ref 12–45)
BASOPHILS # BLD AUTO: 0.6 % (ref 0–1.8)
BASOPHILS # BLD: 0.04 K/UL (ref 0–0.12)
BILIRUB SERPL-MCNC: 0.5 MG/DL (ref 0.1–1.5)
BUN SERPL-MCNC: 16 MG/DL (ref 8–22)
CALCIUM ALBUM COR SERPL-MCNC: 8.7 MG/DL (ref 8.5–10.5)
CALCIUM SERPL-MCNC: 8.9 MG/DL (ref 8.5–10.5)
CHLORIDE SERPL-SCNC: 104 MMOL/L (ref 96–112)
CHOLEST SERPL-MCNC: 207 MG/DL (ref 100–199)
CO2 SERPL-SCNC: 24 MMOL/L (ref 20–33)
CREAT SERPL-MCNC: 1.01 MG/DL (ref 0.5–1.4)
CREAT UR-MCNC: 135 MG/DL
EOSINOPHIL # BLD AUTO: 0.12 K/UL (ref 0–0.51)
EOSINOPHIL NFR BLD: 1.7 % (ref 0–6.9)
ERYTHROCYTE [DISTWIDTH] IN BLOOD BY AUTOMATED COUNT: 39.7 FL (ref 35.9–50)
EST. AVERAGE GLUCOSE BLD GHB EST-MCNC: 120 MG/DL
FASTING STATUS PATIENT QL REPORTED: NORMAL
GFR SERPLBLD CREATININE-BSD FMLA CKD-EPI: 90 ML/MIN/1.73 M 2
GLOBULIN SER CALC-MCNC: 2.7 G/DL (ref 1.9–3.5)
GLUCOSE SERPL-MCNC: 119 MG/DL (ref 65–99)
HBA1C MFR BLD: 5.8 % (ref 4–5.6)
HCT VFR BLD AUTO: 43 % (ref 42–52)
HDLC SERPL-MCNC: 42 MG/DL
HGB BLD-MCNC: 15.1 G/DL (ref 14–18)
IMM GRANULOCYTES # BLD AUTO: 0.04 K/UL (ref 0–0.11)
IMM GRANULOCYTES NFR BLD AUTO: 0.6 % (ref 0–0.9)
LDLC SERPL CALC-MCNC: 132 MG/DL
LYMPHOCYTES # BLD AUTO: 1.22 K/UL (ref 1–4.8)
LYMPHOCYTES NFR BLD: 17.6 % (ref 22–41)
MCH RBC QN AUTO: 31.5 PG (ref 27–33)
MCHC RBC AUTO-ENTMCNC: 35.1 G/DL (ref 32.3–36.5)
MCV RBC AUTO: 89.6 FL (ref 81.4–97.8)
MICROALBUMIN UR-MCNC: <1.2 MG/DL
MICROALBUMIN/CREAT UR: NORMAL MG/G (ref 0–30)
MONOCYTES # BLD AUTO: 0.41 K/UL (ref 0–0.85)
MONOCYTES NFR BLD AUTO: 5.9 % (ref 0–13.4)
NEUTROPHILS # BLD AUTO: 5.09 K/UL (ref 1.82–7.42)
NEUTROPHILS NFR BLD: 73.6 % (ref 44–72)
NRBC # BLD AUTO: 0 K/UL
NRBC BLD-RTO: 0 /100 WBC (ref 0–0.2)
PLATELET # BLD AUTO: 191 K/UL (ref 164–446)
PMV BLD AUTO: 11.1 FL (ref 9–12.9)
POTASSIUM SERPL-SCNC: 4.5 MMOL/L (ref 3.6–5.5)
PROT SERPL-MCNC: 7 G/DL (ref 6–8.2)
RBC # BLD AUTO: 4.8 M/UL (ref 4.7–6.1)
SODIUM SERPL-SCNC: 138 MMOL/L (ref 135–145)
TRIGL SERPL-MCNC: 164 MG/DL (ref 0–149)
TSH SERPL DL<=0.005 MIU/L-ACNC: 3.98 UIU/ML (ref 0.38–5.33)
WBC # BLD AUTO: 6.9 K/UL (ref 4.8–10.8)

## 2025-07-12 PROCEDURE — 84443 ASSAY THYROID STIM HORMONE: CPT

## 2025-07-12 PROCEDURE — 80053 COMPREHEN METABOLIC PANEL: CPT

## 2025-07-12 PROCEDURE — 82043 UR ALBUMIN QUANTITATIVE: CPT

## 2025-07-12 PROCEDURE — 80061 LIPID PANEL: CPT

## 2025-07-12 PROCEDURE — 83036 HEMOGLOBIN GLYCOSYLATED A1C: CPT

## 2025-07-12 PROCEDURE — 36415 COLL VENOUS BLD VENIPUNCTURE: CPT

## 2025-07-12 PROCEDURE — 85025 COMPLETE CBC W/AUTO DIFF WBC: CPT

## 2025-07-12 PROCEDURE — 82570 ASSAY OF URINE CREATININE: CPT

## 2025-07-16 SDOH — ECONOMIC STABILITY: TRANSPORTATION INSECURITY
IN THE PAST 12 MONTHS, HAS LACK OF TRANSPORTATION KEPT YOU FROM MEETINGS, WORK, OR FROM GETTING THINGS NEEDED FOR DAILY LIVING?: PATIENT DECLINED

## 2025-07-16 SDOH — HEALTH STABILITY: PHYSICAL HEALTH: ON AVERAGE, HOW MANY DAYS PER WEEK DO YOU ENGAGE IN MODERATE TO STRENUOUS EXERCISE (LIKE A BRISK WALK)?: 0 DAYS

## 2025-07-16 SDOH — HEALTH STABILITY: PHYSICAL HEALTH: ON AVERAGE, HOW MANY MINUTES DO YOU ENGAGE IN EXERCISE AT THIS LEVEL?: 20 MIN

## 2025-07-16 SDOH — ECONOMIC STABILITY: FOOD INSECURITY: WITHIN THE PAST 12 MONTHS, YOU WORRIED THAT YOUR FOOD WOULD RUN OUT BEFORE YOU GOT MONEY TO BUY MORE.: PATIENT DECLINED

## 2025-07-16 SDOH — ECONOMIC STABILITY: INCOME INSECURITY: HOW HARD IS IT FOR YOU TO PAY FOR THE VERY BASICS LIKE FOOD, HOUSING, MEDICAL CARE, AND HEATING?: NOT HARD AT ALL

## 2025-07-16 SDOH — ECONOMIC STABILITY: INCOME INSECURITY: IN THE LAST 12 MONTHS, WAS THERE A TIME WHEN YOU WERE NOT ABLE TO PAY THE MORTGAGE OR RENT ON TIME?: NO

## 2025-07-16 SDOH — ECONOMIC STABILITY: FOOD INSECURITY: WITHIN THE PAST 12 MONTHS, THE FOOD YOU BOUGHT JUST DIDN'T LAST AND YOU DIDN'T HAVE MONEY TO GET MORE.: PATIENT DECLINED

## 2025-07-16 SDOH — ECONOMIC STABILITY: TRANSPORTATION INSECURITY
IN THE PAST 12 MONTHS, HAS LACK OF RELIABLE TRANSPORTATION KEPT YOU FROM MEDICAL APPOINTMENTS, MEETINGS, WORK OR FROM GETTING THINGS NEEDED FOR DAILY LIVING?: PATIENT DECLINED

## 2025-07-16 ASSESSMENT — LIFESTYLE VARIABLES
HOW OFTEN DO YOU HAVE SIX OR MORE DRINKS ON ONE OCCASION: NEVER
HOW MANY STANDARD DRINKS CONTAINING ALCOHOL DO YOU HAVE ON A TYPICAL DAY: PATIENT DOES NOT DRINK
HOW OFTEN DO YOU HAVE A DRINK CONTAINING ALCOHOL: PATIENT DECLINED
SKIP TO QUESTIONS 9-10: 0
AUDIT-C TOTAL SCORE: -1

## 2025-07-16 ASSESSMENT — SOCIAL DETERMINANTS OF HEALTH (SDOH)
HOW OFTEN DO YOU ATTENT MEETINGS OF THE CLUB OR ORGANIZATION YOU BELONG TO?: NEVER
HOW OFTEN DO YOU HAVE SIX OR MORE DRINKS ON ONE OCCASION: NEVER
WITHIN THE PAST 12 MONTHS, YOU WORRIED THAT YOUR FOOD WOULD RUN OUT BEFORE YOU GOT THE MONEY TO BUY MORE: PATIENT DECLINED
HOW MANY DRINKS CONTAINING ALCOHOL DO YOU HAVE ON A TYPICAL DAY WHEN YOU ARE DRINKING: PATIENT DOES NOT DRINK
IN THE PAST 12 MONTHS, HAS THE ELECTRIC, GAS, OIL, OR WATER COMPANY THREATENED TO SHUT OFF SERVICE IN YOUR HOME?: NO
HOW OFTEN DO YOU GET TOGETHER WITH FRIENDS OR RELATIVES?: ONCE A WEEK
DO YOU BELONG TO ANY CLUBS OR ORGANIZATIONS SUCH AS CHURCH GROUPS UNIONS, FRATERNAL OR ATHLETIC GROUPS, OR SCHOOL GROUPS?: NO
DO YOU BELONG TO ANY CLUBS OR ORGANIZATIONS SUCH AS CHURCH GROUPS UNIONS, FRATERNAL OR ATHLETIC GROUPS, OR SCHOOL GROUPS?: NO
HOW OFTEN DO YOU ATTENT MEETINGS OF THE CLUB OR ORGANIZATION YOU BELONG TO?: NEVER
HOW OFTEN DO YOU ATTEND CHURCH OR RELIGIOUS SERVICES?: MORE THAN 4 TIMES PER YEAR
HOW OFTEN DO YOU GET TOGETHER WITH FRIENDS OR RELATIVES?: ONCE A WEEK
IN A TYPICAL WEEK, HOW MANY TIMES DO YOU TALK ON THE PHONE WITH FAMILY, FRIENDS, OR NEIGHBORS?: MORE THAN THREE TIMES A WEEK
HOW OFTEN DO YOU HAVE A DRINK CONTAINING ALCOHOL: PATIENT DECLINED
HOW OFTEN DO YOU ATTEND CHURCH OR RELIGIOUS SERVICES?: MORE THAN 4 TIMES PER YEAR
HOW HARD IS IT FOR YOU TO PAY FOR THE VERY BASICS LIKE FOOD, HOUSING, MEDICAL CARE, AND HEATING?: NOT HARD AT ALL
IN A TYPICAL WEEK, HOW MANY TIMES DO YOU TALK ON THE PHONE WITH FAMILY, FRIENDS, OR NEIGHBORS?: MORE THAN THREE TIMES A WEEK

## 2025-07-17 ENCOUNTER — OFFICE VISIT (OUTPATIENT)
Dept: MEDICAL GROUP | Facility: PHYSICIAN GROUP | Age: 50
End: 2025-07-17
Payer: COMMERCIAL

## 2025-07-17 VITALS
BODY MASS INDEX: 28.28 KG/M2 | TEMPERATURE: 97.6 F | HEIGHT: 67 IN | WEIGHT: 180.2 LBS | HEART RATE: 64 BPM | SYSTOLIC BLOOD PRESSURE: 124 MMHG | DIASTOLIC BLOOD PRESSURE: 70 MMHG | OXYGEN SATURATION: 97 %

## 2025-07-17 DIAGNOSIS — E78.2 ELEVATED TRIGLYCERIDES WITH HIGH CHOLESTEROL: ICD-10-CM

## 2025-07-17 DIAGNOSIS — Z23 NEED FOR VACCINATION: ICD-10-CM

## 2025-07-17 DIAGNOSIS — E11.9 TYPE 2 DIABETES MELLITUS WITHOUT COMPLICATION, WITHOUT LONG-TERM CURRENT USE OF INSULIN (HCC): ICD-10-CM

## 2025-07-17 DIAGNOSIS — Z00.00 ENCOUNTER FOR WELL ADULT EXAM WITHOUT ABNORMAL FINDINGS: ICD-10-CM

## 2025-07-17 PROCEDURE — 3074F SYST BP LT 130 MM HG: CPT | Performed by: NURSE PRACTITIONER

## 2025-07-17 PROCEDURE — 90746 HEPB VACCINE 3 DOSE ADULT IM: CPT | Mod: JZ | Performed by: NURSE PRACTITIONER

## 2025-07-17 PROCEDURE — 90471 IMMUNIZATION ADMIN: CPT | Performed by: NURSE PRACTITIONER

## 2025-07-17 PROCEDURE — 3078F DIAST BP <80 MM HG: CPT | Performed by: NURSE PRACTITIONER

## 2025-07-17 PROCEDURE — 99396 PREV VISIT EST AGE 40-64: CPT | Mod: 25 | Performed by: NURSE PRACTITIONER

## 2025-07-17 RX ORDER — GLIPIZIDE 10 MG/1
10 TABLET ORAL DAILY
Qty: 100 TABLET | Refills: 3 | Status: SHIPPED | OUTPATIENT
Start: 2025-07-17 | End: 2026-08-21

## 2025-07-17 RX ORDER — DAPAGLIFLOZIN 10 MG/1
10 TABLET, FILM COATED ORAL DAILY
Qty: 90 TABLET | Refills: 3 | Status: SHIPPED | OUTPATIENT
Start: 2025-07-17

## 2025-07-17 RX ORDER — FENOFIBRATE 145 MG/1
145 TABLET, FILM COATED ORAL DAILY
Qty: 90 TABLET | Refills: 3 | Status: SHIPPED | OUTPATIENT
Start: 2025-07-17

## 2025-07-17 ASSESSMENT — FIBROSIS 4 INDEX: FIB4 SCORE: 0.99

## 2025-07-17 NOTE — PROGRESS NOTES
Subjective:   CC:   Chief Complaint   Patient presents with    Annual Exam     Refills     Lab Results     Verbal consent was acquired by the patient to use [employer] ambient listening note generation during this visit Yes    HPI:   Rakan Avalos is a 49 y.o. male who presents for annual exam:    History of Present Illness  The patient presents for a routine checkup.    He has discontinued his fenofibrate medication due to running out of it and has been off this medication for approximately a month. He is requesting a refill of fenofibrate. He reports weight loss with Jardiance but experiences cravings for sweets in the evening with glipizide, typically between 7 and 9 PM. He does not experience these cravings when on Jardiance.  He is considering resuming Jardiance but is concerned about the cost.     His daily routine includes walking his dog, but he does not engage in additional exercise. He has made dietary changes, including reducing bread intake, eliminating soda, and abstaining from alcohol. He is requesting a refill of glipizide.    He reports no ear pain and notes an improvement in his hearing following ear cleaning. He avoids using earbuds as much as possible. He reports no difficulty swallowing, chest pain, shortness of breath, constipation, or diarrhea.    He has received his first dose of the hepatitis B vaccine and is here for the second dose. He also received the varicella vaccine. He had an eye exam 2 months ago. He is up to date with his dental check-ups and had a retinal screening 2 months ago. He declines STD testing and confirms that he is practicing safe sex with one partner.    Diet: He has reduced bread intake, eliminated soda, and abstained from alcohol.  Alcohol: He has quit alcohol.  Tobacco: He reports never smoking.  Sexual Practices: He is practicing safe sex with one partner.    FAMILY HISTORY  There are no changes in his family history.    Anticipatory Guidance:  Cholesterol  screenin2025   LDL controlled: 132  Diabetes screenin2023   A1c controlled: 5.8   UALB/CR: WNL  Retinal exam: 6/3/2025  Monofilament: 6/10/2024  Diet: Recommend more lean meats, fruits, vegetables, whole grains.   Exercise: Encourage regular exercise.   Substance abuse: No   Safe in relationship: Yes  Seatbelts, bike/motorcycle helmet: Yes  Sun protection: Yes   Dentist: Up to date   Eye doctor: Up to date     Cancer Screening:  Colorectal cancer screenin2024, 7-year recall colonoscopy    Infectious Disease Screening/Immunizations:  STI screening: Declines  Chlamydia/Gonorrhea screening: Declines  Hep C screening: Complete  HIV screen: Complete  Practices safe sex: Yes    Immunizations:   Influenza: Out of season   Tetanus: 10/12/2017   Hep B: 2024, 2025, due for final dose after 2025   Pneumonia: Complete   Shingrix: 2025 & due for second dose   Received HPV series: Aged out    Preventative Care Screening:   Osteoporosis Screening: NA  Tobacco Screening: Former smoker  AAA Screening: N/A    He  reports being sexually active and has had partner(s) who are female. He reports using the following method of birth control/protection: Condom.  He  has a past medical history of Diabetes ([facility]), Family history of diabetes mellitus (9/15/2021), GERD (gastroesophageal reflux disease), and Hyperlipidemia.  He  has no past surgical history on file.    Family History   Problem Relation Age of Onset    Heart Disease Mother     Hypertension Mother     No Known Problems Father     Diabetes Sister     Diabetes Brother     No Known Problems Brother     No Known Problems Brother     Diabetes Paternal Uncle     No Known Problems Maternal Grandmother     No Known Problems Maternal Grandfather     No Known Problems Paternal Grandmother     No Known Problems Paternal Grandfather      Social History[1]  Patient Active Problem List    Diagnosis Date Noted    Lower esophageal ring ([place])  "04/19/2022    Overweight (BMI 25.0-29.9) 12/30/2021    Pharyngeal dysphagia 09/15/2021    Elevated triglycerides with high cholesterol 09/15/2021    Osteochondroma of fibula, left 07/30/2019    Type 2 diabetes mellitus, without long-term current use of insulin ([facility]) 07/12/2017    Gastroesophageal reflux disease with esophagitis without hemorrhage 04/07/2017    Anxiety 04/07/2017     Current Medications[2]  Allergies[3]    Review of Systems   Constitutional: Negative for fever, chills and malaise/fatigue.   HENT: Negative for congestion.    Eyes: Negative for pain.   Respiratory: Negative for cough and shortness of breath.    Cardiovascular: Negative for chest pain and leg swelling.   Gastrointestinal: Negative for nausea, vomiting, abdominal pain and diarrhea.   Genitourinary: Negative for dysuria and hematuria.   Skin: Negative for rash.   Neurological: Negative for dizziness, focal weakness and headaches.   Endo/Heme/Allergies: Does not bruise/bleed easily.   Psychiatric/Behavioral: Negative for depression.  The patient is not nervous/anxious.      Objective:   /70 (BP Location: Right arm, Patient Position: Sitting, BP Cuff Size: Adult)   Pulse 64   Temp 36.4 °C (97.6 °F) (Temporal)   Ht 1.702 m (5' 7\")   Wt 81.7 kg (180 lb 3.2 oz)   SpO2 97%   BMI 28.22 kg/m²     Wt Readings from Last 4 Encounters:   07/17/25 81.7 kg (180 lb 3.2 oz)   01/16/25 76.3 kg (168 lb 3.2 oz)   12/20/24 75.3 kg (166 lb)   10/16/24 76.5 kg (168 lb 9.6 oz)     Physical Exam:  Constitutional: Well-developed and well-nourished. Not diaphoretic. No distress.   Skin: Skin is warm and dry. No rash noted.  Head: Atraumatic without lesions.  Eyes: Conjunctivae and extraocular motions are normal. Pupils are equal, round, and reactive to light. No scleral icterus.   Ears:  External ears unremarkable. Tympanic membranes clear and intact.  Nose: Nares patent. Septum midline. Turbinates without erythema nor edema. No discharge. "   Mouth/Throat: Tongue normal. Oropharynx is clear and moist. Posterior pharynx without erythema or exudates.  Neck: Supple, trachea midline. Normal range of motion. No thyromegaly present. No lymphadenopathy--cervical or supraclavicular.  Cardiovascular: Regular rate and rhythm, S1 and S2 without murmur, rubs, or gallops.    Respiratory: Effort normal. Clear to auscultation throughout. No adventitious sounds.   Abdomen: Soft, non tender, and without distention. Active bowel sounds in all four quadrants. No rebound, guarding.  Extremities: No cyanosis, clubbing, erythema, nor edema. Radial pulses intact and symmetric.   Musculoskeletal: All major joints AROM full in all directions without pain.  Neurological: Alert and oriented x 3. Grossly non-focal. Strength and sensation grossly intact.   Psychiatric:  Behavior, mood, and affect are appropriate.    Monofilament testing with a 10 gram force: sensation intact: intact bilaterally  Visual Inspection: Feet without maceration, ulcers, fissures.  Pedal pulses: intact bilaterally    Assessment and Plan:   1. Encounter for well adult exam without abnormal findings  CBC, CMP, electrolytes, fasting blood sugar, kidney function, and liver function tests are all within normal limits. Cholesterol levels have shown significant improvement since June 2024, although not as optimal as October 2024. The second dose of the hepatitis B vaccine will be administered today, with one more dose remaining. The influenza vaccine is scheduled for the fall season. The next colon cancer screening is due in 2031, and the tetanus vaccine is due in 2027. An eye exam was completed 2 months ago.    2. Type 2 diabetes mellitus without complication, without long-term current use of insulin (HCC)  Chronic, ongoing.  Fasting blood sugar is 119, above the normal range of <100, but decreased from 189 last year. A1c improved from 6.6 to 5.8, nearing the non-prediabetic range. Jardiance (empagliflozin) was  previously effective for weight loss but was discontinued due to cost. A prescription for Farxiga (dapagliflozin) 10 mg daily will be sent to the pharmacy. If the cost is prohibitive, it should not be purchased.  Continue glipizide 10 mg daily, refill sent to pharmacy.  A1c will be rechecked in the office during the next visit in 6 months.  Monofilament completed today.  - Diabetic Monofilament LE Exam  - dapagliflozin propanediol (FARXIGA) 10 MG Tab; Take 1 Tablet by mouth every day.  Dispense: 90 Tablet; Refill: 3  - fenofibrate (TRICOR) 145 MG Tab; Take 1 Tablet by mouth every day.  Dispense: 90 Tablet; Refill: 3  - glipiZIDE (GLUCOTROL) 10 MG Tab; Take 1 Tablet by mouth every day.  Dispense: 100 Tablet; Refill: 3    3. Elevated triglycerides with high cholesterol  Chronic, ongoing.  Continue fenofibrate 145 mg daily, refill sent to pharmacy.  - fenofibrate (TRICOR) 145 MG Tab; Take 1 Tablet by mouth every day.  Dispense: 90 Tablet; Refill: 3    4. Need for vaccination  Given today.  - Hepatitis B Vaccine Adult 20+      Health maintenance: Up to date   Labs per orders  Immunizations: Per orders   Patient counseled about skin care, diet, supplements, and exercise.  Discussed  adequate intake of calcium and vitamin D, diet and exercise, colorectal cancer screening.     Follow-up: Return in about 6 months (around 1/17/2026) for Diabetes, A1C in Clinic, Hep B.     I have placed the below orders and discussed them with an approved delegating provider. The MA is performing the below orders under the direction of Dr. Esquivel.      Please note that this dictation was created using voice recognition software. I have worked with consultants from the vendor as well as technical experts from [facility] [employer] to optimize the interface. I have made every reasonable attempt to correct obvious errors, but I expect that there are errors of grammar and possibly content that I did not discover before finalizing the note.        [1]    Social History  Tobacco Use    Smoking status: Former     Current packs/day: 0.00     Average packs/day: 0.1 packs/day for 26.0 years (2.6 ttl pk-yrs)     Types: Cigarettes     Start date: 1986     Quit date: 2011     Years since quittin.5    Smokeless tobacco: Never   Vaping Use    Vaping status: Never Used   Substance Use Topics    Alcohol use: Not Currently     Alcohol/week: 3.0 oz     Types: 5 Shots of liquor per week    Drug use: No   [2]   Current Outpatient Medications   Medication Sig Dispense Refill    dapagliflozin propanediol (FARXIGA) 10 MG Tab Take 1 Tablet by mouth every day. 90 Tablet 3    fenofibrate (TRICOR) 145 MG Tab Take 1 Tablet by mouth every day. 90 Tablet 3    glipiZIDE (GLUCOTROL) 10 MG Tab Take 1 Tablet by mouth every day. 100 Tablet 3     No current facility-administered medications for this visit.   [3] No Known Allergies